# Patient Record
Sex: FEMALE | Race: OTHER | HISPANIC OR LATINO | Employment: FULL TIME | URBAN - METROPOLITAN AREA
[De-identification: names, ages, dates, MRNs, and addresses within clinical notes are randomized per-mention and may not be internally consistent; named-entity substitution may affect disease eponyms.]

---

## 2018-04-13 ENCOUNTER — HOSPITAL ENCOUNTER (OUTPATIENT)
Facility: MEDICAL CENTER | Age: 28
End: 2018-04-13
Attending: OBSTETRICS & GYNECOLOGY | Admitting: OBSTETRICS & GYNECOLOGY

## 2018-04-13 VITALS
RESPIRATION RATE: 16 BRPM | HEART RATE: 91 BPM | SYSTOLIC BLOOD PRESSURE: 111 MMHG | TEMPERATURE: 98.4 F | DIASTOLIC BLOOD PRESSURE: 67 MMHG

## 2018-04-13 LAB
APPEARANCE UR: CLEAR
COLOR UR AUTO: YELLOW
GLUCOSE UR QL STRIP.AUTO: NEGATIVE MG/DL
KETONES UR QL STRIP.AUTO: NEGATIVE MG/DL
LEUKOCYTE ESTERASE UR QL STRIP.AUTO: NEGATIVE
NITRITE UR QL STRIP.AUTO: NEGATIVE
PH UR STRIP.AUTO: 5.5 [PH]
PROT UR QL STRIP: NEGATIVE MG/DL
RBC UR QL AUTO: NEGATIVE
SP GR UR: <=1.005

## 2018-04-13 PROCEDURE — 81002 URINALYSIS NONAUTO W/O SCOPE: CPT

## 2018-04-13 NOTE — PROGRESS NOTES
[facility] LABOR AND DELIVERY TRIAGE PROGRESS NOTE    PATIENT ID:  NAME:  Faith Pascual  MRN:               [id number]  YOB: 1990     28 y.o. female  at 33w per pt    Subjective: Pt with lower abdominal pain and groin pain  Pregnancy complicated by prenatal care in [place].  Pt states possible abnormality on US in [place] but unable to recall name of abnormality.    negative  For CTXS.   positive Feels pain   negative for LOF  negative for vaginal bleeding.   positive for fetal movement    ROS: Patient denies any fever chills, nausea, vomiting, headache, chest pain, shortness of breath, or dysuria or unusual swelling of hands or feet.     Objective:    There were no vitals filed for this visit.  No data recorded.    General: No acute distress, resting comfortably in bed.  HEENT: normocephalic, nontraumatic, PERRLA, EOMI  Cardiovascular: Heart RRR with no murmurs, rubs or gallops. Distal Pulses 2+  Respiratory: symmetric chest expansion, lungs CTAB, with no wheezes, rales, rhonci  Abdomen: gravid, nontender  Musculoskeletal: strength 5/5 in four extremities  Neuro: non focal with no numbness, tingling or changes in sensation    Cervix: deferred  Peak: Uterine Contractions none      Assessment: 28 y.o. Female  at 33w with 2 prior CS deliveries in [place]  Last OB visit in Feb in [place]    Plan:   1. Discharge home with return precautions and instructions to return for abdominal pain, leaking of fluid or blood, headache, vision changes, lack of fetal movement  2.  Call today to establish care with [facility]       Discussed case with Dr. Liriano, TPN Attending. Case was discussed and attending agreed with plan prior to discharge of patient.

## 2018-04-13 NOTE — PROGRESS NOTES
1400-pt presents from home with c/o lower abd pain, pt pointed to where the round ligaments are, no PNC this pregnancy, pt states that she has had two previous c/s, states that at night she will feel some uc's and then feels the round ligament pain, no c/o leaking, bleeding or uc's at this time, states that she is feeling baby move normally, FHR dopplered at 150, TOCO applied, ua dipped negative  1415-Dr Brito in room for assessment, will discuss with Dr Liriano   1445-discharge order received  1450-pt discharged home with PTL precautions and instructions on how to get a hold of the pregnancy center for PNC, verbalized understanding, left ambulatory with family

## 2018-04-17 ENCOUNTER — APPOINTMENT (OUTPATIENT)
Dept: OBGYN | Facility: CLINIC | Age: 28
End: 2018-04-17
Payer: MEDICAID

## 2018-05-04 ENCOUNTER — INITIAL PRENATAL (OUTPATIENT)
Dept: OBGYN | Facility: CLINIC | Age: 28
End: 2018-05-04
Payer: MEDICAID

## 2018-05-04 ENCOUNTER — HOSPITAL ENCOUNTER (OUTPATIENT)
Facility: MEDICAL CENTER | Age: 28
End: 2018-05-04
Attending: NURSE PRACTITIONER
Payer: MEDICAID

## 2018-05-04 ENCOUNTER — HOSPITAL ENCOUNTER (OUTPATIENT)
Facility: MEDICAL CENTER | Age: 28
End: 2018-05-04
Attending: OBSTETRICS & GYNECOLOGY

## 2018-05-04 VITALS
WEIGHT: 156 LBS | HEIGHT: 61 IN | DIASTOLIC BLOOD PRESSURE: 62 MMHG | SYSTOLIC BLOOD PRESSURE: 114 MMHG | BODY MASS INDEX: 29.45 KG/M2

## 2018-05-04 DIAGNOSIS — O09.892 SUPERVISION OF OTHER HIGH RISK PREGNANCIES, SECOND TRIMESTER: ICD-10-CM

## 2018-05-04 DIAGNOSIS — Z98.891 HISTORY OF CESAREAN SECTION: ICD-10-CM

## 2018-05-04 DIAGNOSIS — Z34.90 ENCOUNTER FOR SUPERVISION OF NORMAL PREGNANCY, ANTEPARTUM, UNSPECIFIED GRAVIDITY: ICD-10-CM

## 2018-05-04 DIAGNOSIS — Z34.90 ENCOUNTER FOR SUPERVISION OF NORMAL PREGNANCY, ANTEPARTUM, UNSPECIFIED GRAVIDITY: Primary | ICD-10-CM

## 2018-05-04 LAB
APPEARANCE UR: CLEAR
BILIRUB UR STRIP-MCNC: NORMAL MG/DL
COLOR UR AUTO: YELLOW
GLUCOSE UR STRIP.AUTO-MCNC: NEGATIVE MG/DL
KETONES UR STRIP.AUTO-MCNC: NEGATIVE MG/DL
LEUKOCYTE ESTERASE UR QL STRIP.AUTO: NEGATIVE
NITRITE UR QL STRIP.AUTO: NEGATIVE
PH UR STRIP.AUTO: 7 [PH] (ref 5–8)
PROT UR QL STRIP: NEGATIVE MG/DL
RBC UR QL AUTO: NEGATIVE
SP GR UR STRIP.AUTO: 1.02
UROBILINOGEN UR STRIP-MCNC: NORMAL MG/DL

## 2018-05-04 PROCEDURE — 87491 CHLMYD TRACH DNA AMP PROBE: CPT

## 2018-05-04 PROCEDURE — 88175 CYTOPATH C/V AUTO FLUID REDO: CPT

## 2018-05-04 PROCEDURE — 87624 HPV HI-RISK TYP POOLED RSLT: CPT

## 2018-05-04 PROCEDURE — 87591 N.GONORRHOEAE DNA AMP PROB: CPT

## 2018-05-04 PROCEDURE — 59402 PR NEW OB HIGH RISK: CPT | Performed by: NURSE PRACTITIONER

## 2018-05-04 PROCEDURE — 8198 PREG CTR PKG RATE (SYSTEM): Performed by: OBSTETRICS & GYNECOLOGY

## 2018-05-04 PROCEDURE — 81002 URINALYSIS NONAUTO W/O SCOPE: CPT | Performed by: NURSE PRACTITIONER

## 2018-05-04 RX ORDER — ACETAMINOPHEN 500 MG
500-1000 TABLET ORAL EVERY 6 HOURS PRN
Status: ON HOLD | COMMUNITY
End: 2018-08-03

## 2018-05-04 NOTE — PATIENT INSTRUCTIONS
P:  1.  GC/CT done. Pap done.         2.  Prenatal labs, including 1hr GTT ordered - lab slip given        3.  Discussed PNV, diet, and adequate water intake        4.  NOB packet given        5.  Return to office in 4 wks        6.  Complete OB US asap.        7.  BTL consent at next visit.         8.  RCS consent signed.

## 2018-05-04 NOTE — PROGRESS NOTES
Pt here for New OB today  Phone: [phone]   Pharmacy verified  Pt is taking PNV  Desires BTL  1 hr GTT and blood work given today

## 2018-05-04 NOTE — LETTER
Estudios Para Detectar los Portadores de la Fibrosis Quística   (La Enfermedad Fibroquística del Páncreas)    Faith Pascual    Esta información esta relacionada con un examen de ann que puede determinar si usted o duncan carlos es portador del gen que causa la enfermedad hereditaria que se llama la fibrosis quística.     ¿QUE ES LA ENFERMEDAD FIBROSIS QUÍSTICA?  · La  fibrosis quística es ernesto enfermedad hereditaria que afecta a más de 25,000 niños y jóvenes adultos americanos.  · Los síntomas de la fibrosis quística varían de ernesto persona a otra.  Los síntomas más comunes son congestión pulmonar, diarrea y retraso en el crecimiento del salinas.  La mayoría de las personas con la fibrosis quística padecen de problemas médicos muy severos, y algunas mueren jóvenes.  Otras tienen tan pocos síntomas que no se percatan de que tienen fibrosis quística.  · La fibrosis quística no afecta en absoluto la inteligencia de la persona.  · Aunque actualmente no hay ernesto bonny para la fibrosis quística, los científicos están avanzando con respecto a nuevos tratamientos y en la búsqueda de la bonny.  Anteriormente la mayoría de las personas que padecían de fibrosis quística morían muy jóvenes.  Hoy en día, muchas personas que padecen de la fibrosis quística sobreviven hasta los 20 o 30 anos de edad.    ¿EXISTE LA POSIBILIDAD DE QUE MI ASHLIE PUEDA TENER FIBROSIS QUÍSTICA?  · Usted puede tener un hijo con la fibrosis quística aun cuando no hay nadie en duncan brady que la padezca.  (Lisa la grafica que sigue.)  · Existe ernesto prueba que puede ayudarle a determinar si aleksandra un gen para la fibrosis quística y si por eso corre un riesgo de tener un hijo con la enfermedad.  · Si ambos padres son portadores del gen para la fibrosis quística, hay ernesto (1) probabilidad entre 4 (25%) en cada embarazo, de que puedan tener un hijo afectada con fibrosis quística.  · Los portadores del gen para la fibrosis quística tienen ernesto copia del gen  normal y el otro gen alterado.  · Las personas con fibrosis quística tienen dos genes alterados para la fibrosis quística,  · Normalmente la mayoría de individuos tienen dos copias normales del gen para fibrosis quística.    Riesgo aproximado de que ernesto carlos sin familiares con fibrosis quística pueda tener un hijo con fibrosis quística:  Origen / Riesgo  Ernesto carlos Caucásica (de rohini alexa):  1 en 2,500  Ernesto carlos :  1 en 8,000  Ernesto carlos Afroamericana (de rohini romaine):  1 en 15,000  Ernesto carlos Asiática:  1 en 32,000    ¿EXISTEN PRUEBAS PARA DETECTAR LOS PORTADORES DE LA FIBROSIS QUÍSTICA?  · Hay ernesto prueba de ann para determinar si usted o duncan carlos portan el gen para la fibrosis quística.  · Es importante entender que la prueba no detecta todos los portadores del gen para la fibrosis quística.  · Si la prueba determina que ambos padres con portadores, se puede realizar otras pruebas para determinar si duncan futuro aron esta afectado.    ¿CUANTO BAYLEE LA PRUEBA PARA DETERMINAR SI SOY PORTADOR(A) DEL GEN PARA LA FIBROSIS QUÍSTICA?  · El costo de la prueba varia según duncan póliza de seguro medico y el laboratorio donde se efectúa el análisis.  · El costo promedio de la prueba es de $300.  · Duncan consejera genética puede darle mas información acera de esta prueba para determinar si usted es portador de la fibrosis quística.    _____  Si, yo estoy interesada y me gustaria obtener mas información al respecto.  _____  No tengo interés ni en hacerme la prueba para determinar si soy portador(a) de gen para la fibrosis quística ni en recibir mas información al respecto.    Firma del paciente: _____________________________   5/4/2018

## 2018-05-04 NOTE — PROGRESS NOTES
"Subjective:    Faith Pascual is a 28 y.o.   @ EGA: 26w0d DUNIA: Estimated Date of Delivery: 8/10/18  per US in [place] who presents for her new OB exam.  She has no complaints.  Too late for AFP.  Declines CF.  Reports good FM.  Denies VB, LOF, or cramping.  Denies dysuria, vaginal DC.  Pt is  and lives with . Not presently working.  Pregnancy is unplanned but wanted.  Desires BTL, hx of 2 CS in [place].         No chief complaint on file.          HPI  Review of Systems   All other systems reviewed and are negative.         Objective:     /62   Ht 1.549 m (5' 1\")   Wt 70.8 kg (156 lb)   BMI 29.48 kg/m²    FHTs: 150  Fundal ht: 28   Wet mount: deferred    Physical Exam   Constitutional: She is oriented to person, place, and time. She appears well-developed and well-nourished.   HENT:   Head: Normocephalic and atraumatic.   Eyes:   Eye and ear exam deferred   Neck: Normal range of motion. Neck supple. No thyromegaly present.   Cardiovascular: Normal rate, regular rhythm, normal heart sounds and intact distal pulses.    Pulmonary/Chest: Effort normal and breath sounds normal. Right breast exhibits no inverted nipple, no mass, no nipple discharge, no skin change and no tenderness. Left breast exhibits no inverted nipple, no mass, no nipple discharge, no skin change and no tenderness.   Abdominal: Soft. Bowel sounds are normal.   Gravid   Genitourinary: Vagina normal and uterus normal. Pelvic exam was performed with patient supine. Cervix exhibits no motion tenderness, no discharge and no friability. Right adnexum displays no mass, no tenderness and no fullness. Left adnexum displays no mass, no tenderness and no fullness.   Musculoskeletal: Normal range of motion.   Neurological: She is alert and oriented to person, place, and time.   Skin: Skin is warm and dry.   Psychiatric: She has a normal mood and affect. Her behavior is normal. Judgment and thought content normal. "   Nursing note and vitals reviewed.          A:   1.  IUP @ 26w0d DUNIA: Estimated Date of Delivery: 8/10/18 per US         2.  S=D        3.    Patient Active Problem List    Diagnosis Date Noted   • Supervision of other high risk pregnancies, second trimester 2018   • History of  section 2018       P:  1.  GC/CT done. Pap done.         2.  Prenatal labs, including 1hr GTT ordered - lab slip given        3.  Discussed PNV, diet, and adequate water intake        4.  NOB packet given        5.  Return to office in 4 wks        6.  Complete OB US asap.        7.  BTL consent at next visit.         8.  RCS consent signed.

## 2018-05-07 DIAGNOSIS — O09.892 SUPERVISION OF OTHER HIGH RISK PREGNANCIES, SECOND TRIMESTER: ICD-10-CM

## 2018-05-07 LAB — AMBIGUOUS DTTM AMBI4: NORMAL

## 2018-05-07 PROCEDURE — 88175 CYTOPATH C/V AUTO FLUID REDO: CPT

## 2018-05-10 LAB
C TRACH DNA GENITAL QL NAA+PROBE: NEGATIVE
CYTOLOGY REG CYTOL: NORMAL
HPV HR 12 DNA CVX QL NAA+PROBE: NEGATIVE
HPV16 DNA SPEC QL NAA+PROBE: NEGATIVE
HPV18 DNA SPEC QL NAA+PROBE: NEGATIVE
N GONORRHOEA DNA GENITAL QL NAA+PROBE: NEGATIVE
SPECIMEN SOURCE: NORMAL
SPECIMEN SOURCE: NORMAL

## 2018-05-16 ENCOUNTER — APPOINTMENT (OUTPATIENT)
Dept: RADIOLOGY | Facility: IMAGING CENTER | Age: 28
End: 2018-05-16
Attending: NURSE PRACTITIONER
Payer: MEDICAID

## 2018-05-16 ENCOUNTER — HOSPITAL ENCOUNTER (OUTPATIENT)
Dept: LAB | Facility: MEDICAL CENTER | Age: 28
End: 2018-05-16
Attending: NURSE PRACTITIONER
Payer: COMMERCIAL

## 2018-05-16 DIAGNOSIS — Z34.90 ENCOUNTER FOR SUPERVISION OF NORMAL PREGNANCY, ANTEPARTUM, UNSPECIFIED GRAVIDITY: ICD-10-CM

## 2018-05-16 LAB — GLUCOSE 1H P 50 G GLC PO SERPL-MCNC: 126 MG/DL (ref 70–139)

## 2018-05-16 PROCEDURE — 76805 OB US >/= 14 WKS SNGL FETUS: CPT | Mod: 26 | Performed by: OBSTETRICS & GYNECOLOGY

## 2018-05-16 PROCEDURE — 76805 OB US >/= 14 WKS SNGL FETUS: CPT | Mod: TC | Performed by: OBSTETRICS & GYNECOLOGY

## 2018-05-17 ENCOUNTER — HOSPITAL ENCOUNTER (EMERGENCY)
Facility: MEDICAL CENTER | Age: 28
End: 2018-05-17
Payer: MEDICAID

## 2018-05-17 ENCOUNTER — HOSPITAL ENCOUNTER (OUTPATIENT)
Facility: MEDICAL CENTER | Age: 28
End: 2018-05-17
Attending: OBSTETRICS & GYNECOLOGY | Admitting: OBSTETRICS & GYNECOLOGY

## 2018-05-17 VITALS
BODY MASS INDEX: 27.66 KG/M2 | HEART RATE: 93 BPM | DIASTOLIC BLOOD PRESSURE: 62 MMHG | TEMPERATURE: 97.9 F | SYSTOLIC BLOOD PRESSURE: 109 MMHG | HEIGHT: 63 IN | WEIGHT: 156.09 LBS

## 2018-05-17 LAB
APPEARANCE UR: CLEAR
COLOR UR AUTO: YELLOW
GLUCOSE UR QL STRIP.AUTO: NEGATIVE MG/DL
KETONES UR QL STRIP.AUTO: NEGATIVE MG/DL
LEUKOCYTE ESTERASE UR QL STRIP.AUTO: NEGATIVE
NITRITE UR QL STRIP.AUTO: NEGATIVE
PH UR STRIP.AUTO: 7 [PH]
PROT UR QL STRIP: NEGATIVE MG/DL
RBC UR QL AUTO: NEGATIVE
SP GR UR: 1.01

## 2018-05-17 PROCEDURE — 96372 THER/PROPH/DIAG INJ SC/IM: CPT

## 2018-05-17 PROCEDURE — A9270 NON-COVERED ITEM OR SERVICE: HCPCS | Performed by: STUDENT IN AN ORGANIZED HEALTH CARE EDUCATION/TRAINING PROGRAM

## 2018-05-17 PROCEDURE — 81002 URINALYSIS NONAUTO W/O SCOPE: CPT

## 2018-05-17 PROCEDURE — 700102 HCHG RX REV CODE 250 W/ 637 OVERRIDE(OP): Performed by: STUDENT IN AN ORGANIZED HEALTH CARE EDUCATION/TRAINING PROGRAM

## 2018-05-17 PROCEDURE — 700111 HCHG RX REV CODE 636 W/ 250 OVERRIDE (IP)

## 2018-05-17 RX ORDER — KETOROLAC TROMETHAMINE 30 MG/ML
30 INJECTION, SOLUTION INTRAMUSCULAR; INTRAVENOUS ONCE
Status: COMPLETED | OUTPATIENT
Start: 2018-05-17 | End: 2018-05-17

## 2018-05-17 RX ORDER — KETOROLAC TROMETHAMINE 30 MG/ML
INJECTION, SOLUTION INTRAMUSCULAR; INTRAVENOUS
Status: COMPLETED
Start: 2018-05-17 | End: 2018-05-17

## 2018-05-17 RX ORDER — KETOROLAC TROMETHAMINE 30 MG/ML
30 INJECTION, SOLUTION INTRAMUSCULAR; INTRAVENOUS ONCE
Status: DISCONTINUED | OUTPATIENT
Start: 2018-05-17 | End: 2018-05-17

## 2018-05-17 RX ORDER — ACETAMINOPHEN 325 MG/1
650 TABLET ORAL ONCE
Status: COMPLETED | OUTPATIENT
Start: 2018-05-17 | End: 2018-05-17

## 2018-05-17 RX ORDER — TERBUTALINE SULFATE 1 MG/ML
0.25 INJECTION, SOLUTION SUBCUTANEOUS ONCE
Status: DISCONTINUED | OUTPATIENT
Start: 2018-05-17 | End: 2018-05-17 | Stop reason: HOSPADM

## 2018-05-17 RX ADMIN — KETOROLAC TROMETHAMINE 30 MG: 30 INJECTION, SOLUTION INTRAMUSCULAR at 13:41

## 2018-05-17 RX ADMIN — ACETAMINOPHEN 650 MG: 325 TABLET, FILM COATED ORAL at 12:10

## 2018-05-17 RX ADMIN — KETOROLAC TROMETHAMINE 30 MG: 30 INJECTION, SOLUTION INTRAMUSCULAR; INTRAVENOUS at 13:41

## 2018-05-17 NOTE — PROGRESS NOTES
29yo, , edc8/10, 27.6 presents with c/o Headaches and constant abdominal tightness. Pt denies LOF, vag bleeding. POS fm. EFM and [place] placed. VSS.  Pt states her headache began last night. She has not taken anything for it. She states that she frequently gets headaches and that Tylenol works for her. The abdominal tightness started yesterday afternoon.   1205 Dr Hoskins updated. Order for Tylenol rcvd.   1211 Tylenol given. Pt does not feel UCs.   1230 Dr. Hoskins updated about pts UCs. Order rcvd.   1240 Language Line used to inform pt of order for Terbutaline. Pt states through  that her headache has not improved but that she does not feel her UCs. Pt wants to wait until she voids to see if she still needs injection.   1250 UA obtained and dipped, see results.   1340 Language Line used to inform pt of Dr. Hoskins ordering Toradol IM. Pt states that the abdominal tightness has improved. She said that her headache is primarily on the left and located behind her eye. This is only a slight improvement from this morning and since she took the tylenol. Pt accepted the Toradol injection and tolerated it well.   1430 Language line used for discharge instructions. Pt only has a slight headache now.  Discharge instructions given, pt states understanding. Pt discharged to home  in stable condition, ambulatory, with family.

## 2018-05-18 ENCOUNTER — TELEPHONE (OUTPATIENT)
Dept: OBGYN | Facility: CLINIC | Age: 28
End: 2018-05-18

## 2018-05-20 NOTE — ADDENDUM NOTE
Encounter addended by: Alejandro Lamas on: 5/20/2018 10:36 AM<BR>    Actions taken: Charge Capture section accepted

## 2018-06-01 ENCOUNTER — ROUTINE PRENATAL (OUTPATIENT)
Dept: OBGYN | Facility: CLINIC | Age: 28
End: 2018-06-01

## 2018-06-01 VITALS — SYSTOLIC BLOOD PRESSURE: 116 MMHG | DIASTOLIC BLOOD PRESSURE: 64 MMHG | BODY MASS INDEX: 28.87 KG/M2 | WEIGHT: 163 LBS

## 2018-06-01 DIAGNOSIS — Z98.891 HISTORY OF CESAREAN SECTION: ICD-10-CM

## 2018-06-01 DIAGNOSIS — O09.893 HISTORY OF PRETERM DELIVERY, CURRENTLY PREGNANT IN THIRD TRIMESTER: ICD-10-CM

## 2018-06-01 DIAGNOSIS — O09.892 SUPERVISION OF OTHER HIGH RISK PREGNANCIES, SECOND TRIMESTER: Primary | ICD-10-CM

## 2018-06-01 DIAGNOSIS — O28.3 ABNORMAL PREGNANCY US: ICD-10-CM

## 2018-06-01 PROCEDURE — 90471 IMMUNIZATION ADMIN: CPT | Performed by: PHYSICIAN ASSISTANT

## 2018-06-01 PROCEDURE — 90715 TDAP VACCINE 7 YRS/> IM: CPT | Performed by: PHYSICIAN ASSISTANT

## 2018-06-01 PROCEDURE — 90040 PR PRENATAL FOLLOW UP: CPT | Performed by: PHYSICIAN ASSISTANT

## 2018-06-01 NOTE — PROGRESS NOTES
OB f/u today  Pt reports decreased FM x 2 weeks   No VB, LOF or UC's.  Good phone # [phone]  Kick count sheet given today.  Desires TDap  Desires BTL  Pt has not received a call from Dr. Gaona's office yet  TDap given to patient on R Deltoid. Screening checklist reviewed with patient. VIS given - verified by AC

## 2018-06-01 NOTE — LETTER
Cuente los Movimientos de duncan Bebé  Otro paso importante para la umu de duncan bebé    Faith Mathew Pascual     [facility]            Dept: [phone]    ¿Cuántas semanas tiene de embarazo? 30w0d    Fecha cuando tiene que comenzar a contar el movimiento: 6-1-2018                  Colleen debe usar amanda diagrama    Ernesto manera en que duncan doctor puede controlar a umu de duncan bebé es sabiendo cuantas veces se mueve duncan bebé en el útero, o por medio de las “pataditas”.  Usted podrá ayudarle a duncan médico al usar cada día el siguiente diagrama.    Cada día, usted debe prestar atención a cuantas horas le lleva a duncan bebé moverse 10 veces.  Comience a contar en la mañana, lo antes posible después de haberse levantado.    · Primeramente, escriba la hora en que se mueve duncan bebé, hasta llegar a 10 veces.  · Colóquele un check o palomita a cada cuadrito cada vez que duncan bebé se mueva hasta que complete 10 veces.  · Escriba la hora cuando termine de contar 10 veces en la última columna.  · Sume el total del tiempo que le llevó contar los 10 movimientos.  · Finalmente, complete el cuadrito de cuantas horas le llevó hacerlo.    Después de antonette contado los 10 movimientos, ya no tendrá que contar los demás movimientos por el ramona del día.  A la mañana siguiente, comience a contar de nuevo cuantas veces se mueve el bebé desde el momento en que se levante.    ¿Qué tendría que considerarse un “movimiento”?  Es difícil de decirlo porque es distinto de ernesto madre a otra, y de un embarazo a otro.  Lo importante es que cuente el movimiento de la misma manera tariq el transcurso de duncan embarazo.  Si tiene preguntas adicionales, pregúntele a duncan doctor.    ¡Cuente cuidadosamente cada día!     MUESTRA:  Semana 28    ¿Cuántas horas le ha llevado sentir 10 movimientos?        Hora de Inicio     1     2     3     4     5     6     7     8     9     10   Hora de Finlizar   Jonnie. 8:20 ·  ·  ·  ·  ·  ·  ·  ·  ·  ·  11:40   Mar.               Mié.                Jue.               Vie.               Sáb.               Dom.                 IMPORTANTE:  Usted debe contactar a duncan doctor si le lleva más de 2 horas sentir 10 movimientos de duncan bebé.    Cada mañana, escriba la hora de inicio y comience a contar los movimientos de duncan bebé.  Hágalo colocándole un check o palomita a cada cuadrito cada vez que sienta un movimiento de duncan bebé.  Cuando haya sentido 10 “pataditas”, escriba la hora en que terminó de contar en la última columna.  Luego, complete en la cajita (arriba de la rose de check o palomita) el número total de horas que le llevó hacerlo.  Asegúrese de leer completamente las instrucciones en la página anterior.

## 2018-06-20 ENCOUNTER — ROUTINE PRENATAL (OUTPATIENT)
Dept: OBGYN | Facility: CLINIC | Age: 28
End: 2018-06-20

## 2018-06-20 VITALS — BODY MASS INDEX: 29.58 KG/M2 | DIASTOLIC BLOOD PRESSURE: 64 MMHG | SYSTOLIC BLOOD PRESSURE: 110 MMHG | WEIGHT: 167 LBS

## 2018-06-20 DIAGNOSIS — Z98.891 HISTORY OF CESAREAN SECTION: ICD-10-CM

## 2018-06-20 DIAGNOSIS — O09.892 SUPERVISION OF OTHER HIGH RISK PREGNANCIES, SECOND TRIMESTER: Primary | ICD-10-CM

## 2018-06-20 DIAGNOSIS — O28.3 ABNORMAL PREGNANCY US: ICD-10-CM

## 2018-06-20 DIAGNOSIS — O09.893 HISTORY OF PRETERM DELIVERY, CURRENTLY PREGNANT IN THIRD TRIMESTER: ICD-10-CM

## 2018-06-20 PROCEDURE — 90040 PR PRENATAL FOLLOW UP: CPT | Performed by: NURSE PRACTITIONER

## 2018-06-20 NOTE — PROGRESS NOTES
Pt here today for OB follow up  Pt states having abdominal pain   Reports +NATHANIEL Thomas # [phone]  Pharmacy Confirmed.  Pt has appt with JAIR on 7/2/18

## 2018-06-20 NOTE — LETTER
2018              Faith Pascual is currently being cared for at [facility].  She is pregnant and expecting to deliver via  section on or around 8/10/2018. Faith is requesting her mothers presence for the birth and assistance at home following surgery.        Thank you,          FLORENTINO Bo.    Electronically Signed

## 2018-06-20 NOTE — LETTER
2018            Faith Pascual is currently being cared for at [facility].  She is pregnant and expecting to deliver via  section on or around 8/10/2018. Faith is requesting her mothers presence for the birth and assistance at home following surgery.    Her mother's name is Pretty Hay. : 1971      Thank you,          FLORENTINO Bo.

## 2018-06-20 NOTE — PROGRESS NOTES
S:  Pt is  at 32w5d for routine OB follow up.  Reports increased pelvic pain, cannot stand at times. Pt is asking for letter to allow mother to come from [place] to help her after she has  Her C/S. Reports good FM.  Denies VB, LOF, RUCs or vaginal DC.    O:  Please see above vitals.        FHTs: 140        Fundal ht: 32 cm.        S=D        Declines SVE    A:  IUP at 32w5d  Patient Active Problem List    Diagnosis Date Noted   • History of  delivery x 2 - 35, 36wk 2018   • [facility]   • Supervision of other high risk pregnancies, second trimester 2018   • History of C/S x 2 - desires repeat, BTL (Consent signed 18) 2018        P:  1.  GBS @ 35 wks.          2.  Continue FKCs.          3.  Questions answered.          4.  Encouraged pt to tour L&D.          5.  Encourage adequate water intake.        6.  F/u 2 wks.        7.  Referral for R C/S and BTL sent

## 2018-07-06 ENCOUNTER — HOSPITAL ENCOUNTER (OUTPATIENT)
Facility: MEDICAL CENTER | Age: 28
End: 2018-07-06
Attending: NURSE PRACTITIONER
Payer: COMMERCIAL

## 2018-07-06 ENCOUNTER — ROUTINE PRENATAL (OUTPATIENT)
Dept: OBGYN | Facility: CLINIC | Age: 28
End: 2018-07-06

## 2018-07-06 ENCOUNTER — HOSPITAL ENCOUNTER (OUTPATIENT)
Dept: LAB | Facility: MEDICAL CENTER | Age: 28
End: 2018-07-06
Attending: NURSE PRACTITIONER
Payer: COMMERCIAL

## 2018-07-06 VITALS — WEIGHT: 170 LBS | DIASTOLIC BLOOD PRESSURE: 60 MMHG | BODY MASS INDEX: 30.11 KG/M2 | SYSTOLIC BLOOD PRESSURE: 122 MMHG

## 2018-07-06 DIAGNOSIS — Z98.891 HISTORY OF CESAREAN SECTION: ICD-10-CM

## 2018-07-06 DIAGNOSIS — O09.893 HISTORY OF PRETERM DELIVERY, CURRENTLY PREGNANT IN THIRD TRIMESTER: ICD-10-CM

## 2018-07-06 DIAGNOSIS — O09.893 SUPERVISION OF OTHER HIGH RISK PREGNANCIES, THIRD TRIMESTER: ICD-10-CM

## 2018-07-06 DIAGNOSIS — O28.3 ABNORMAL PREGNANCY US: ICD-10-CM

## 2018-07-06 LAB
ABO GROUP BLD: NORMAL
BACTERIA #/AREA URNS HPF: ABNORMAL /HPF
BLD GP AB SCN SERPL QL: NORMAL
EPI CELLS #/AREA URNS HPF: ABNORMAL /HPF
RH BLD: NORMAL
WBC #/AREA URNS HPF: ABNORMAL /HPF

## 2018-07-06 PROCEDURE — 90040 PR PRENATAL FOLLOW UP: CPT | Performed by: NURSE PRACTITIONER

## 2018-07-06 NOTE — PROGRESS NOTES
SUBJECTIVE:  Pt is a 28 y.o.   at 35w0d  gestation. Presents today for follow-up prenatal care. Reports no issues at this time.  Reports good  fetal movement. Denies cramping/contractions, bleeding or leaking of fluid. Denies dysuria, headaches, N/V, or other issues at this time. Generally feels well today. Reports tingling in hands and headache on and off relieved by tylenol. She reports like 6 contractions in a day, more at night. Not progressive or strong at this time. Patient reports that she brand labs from [place] but they were never scanned in and not sure if whole panel was done.     OBJECTIVE:  - See prenatal vitals flow  -   Vitals:    18 1427   BP: 122/60   Weight: 77.1 kg (170 lb)                 ASSESSMENT:   - IUP at 35w0d    - S=D   -   Patient Active Problem List    Diagnosis Date Noted   • History of  delivery x 2 - 35, 36wk 2018   • Abnormal pregnancy US - [facility] referral for CNS abnormalities seen on US 2018   • Supervision of other high risk pregnancies, third trimester 2018   • History of C/S x 2 - desires repeat, BTL (Consent signed 18) 2018         PLAN:  - GBS collected   - To do PNP ASAP  - redo of letter to immigration for patient's mother   - To LnD with any worsening pain or progressive contractions for possibly sooner C/S  - S/sx pregnancy and labor warning signs vs general discomforts discussed  - Fetal movements and kick counts reviewed   - Adequate hydration reinforced  - Nutrition/exercise/vitamin education; continued PNV  - C/s date pending   - S/p TDAP vacc  - Anticipatory guidance given  - RTC in 1 weeks for follow-up prenatal care

## 2018-07-06 NOTE — LETTER
July 6, 2018      Faith Pascual is currently pregnant and being cared for by [facility]. She will be having her csection when she is 39 weeks and will need assistance from 38 weeks onward starting 7/27/18. She needs assistance from her mother because she does not have other support people [place] in the [place], thank you for understanding. Pretty Hay is her mother's name and her birthday is [birth year]1971.             Thank you,          ZEINAB RiveroRYESENIA.    Electronically Signed

## 2018-07-06 NOTE — PROGRESS NOTES
Pt here today for OB follow up  GBS to be done today  Reports +FM  WT: 170 lb  BP: 122/60  Pt states she has been getting contractions that come and go, pt also reports having strong headaches everyday, numbness on both hands and itching. sensation all over her body x 1 week.   Pt states she had appt with Dr. Gaona on 07/02  William # [phone]

## 2018-07-07 LAB
APPEARANCE UR: ABNORMAL
BASOPHILS # BLD AUTO: 0.1 % (ref 0–1.8)
BASOPHILS # BLD: 0.01 K/UL (ref 0–0.12)
BILIRUB UR QL STRIP.AUTO: NEGATIVE
COLOR UR: YELLOW
EOSINOPHIL # BLD AUTO: 0.02 K/UL (ref 0–0.51)
EOSINOPHIL NFR BLD: 0.2 % (ref 0–6.9)
ERYTHROCYTE [DISTWIDTH] IN BLOOD BY AUTOMATED COUNT: 49.5 FL (ref 35.9–50)
GLUCOSE UR STRIP.AUTO-MCNC: NEGATIVE MG/DL
HBV SURFACE AG SER QL: NEGATIVE
HCT VFR BLD AUTO: 37.9 % (ref 37–47)
HGB BLD-MCNC: 12.4 G/DL (ref 12–16)
HIV 1+2 AB+HIV1 P24 AG SERPL QL IA: NON REACTIVE
IMM GRANULOCYTES # BLD AUTO: 0.05 K/UL (ref 0–0.11)
IMM GRANULOCYTES NFR BLD AUTO: 0.6 % (ref 0–0.9)
KETONES UR STRIP.AUTO-MCNC: NEGATIVE MG/DL
LEUKOCYTE ESTERASE UR QL STRIP.AUTO: ABNORMAL
LYMPHOCYTES # BLD AUTO: 1.7 K/UL (ref 1–4.8)
LYMPHOCYTES NFR BLD: 19.2 % (ref 22–41)
MCH RBC QN AUTO: 28.6 PG (ref 27–33)
MCHC RBC AUTO-ENTMCNC: 32.7 G/DL (ref 33.6–35)
MCV RBC AUTO: 87.3 FL (ref 81.4–97.8)
MICRO URNS: ABNORMAL
MONOCYTES # BLD AUTO: 0.54 K/UL (ref 0–0.85)
MONOCYTES NFR BLD AUTO: 6.1 % (ref 0–13.4)
NEUTROPHILS # BLD AUTO: 6.52 K/UL (ref 2–7.15)
NEUTROPHILS NFR BLD: 73.8 % (ref 44–72)
NITRITE UR QL STRIP.AUTO: NEGATIVE
NRBC # BLD AUTO: 0 K/UL
NRBC BLD-RTO: 0 /100 WBC
PH UR STRIP.AUTO: 7 [PH]
PLATELET # BLD AUTO: 158 K/UL (ref 164–446)
PMV BLD AUTO: 12.8 FL (ref 9–12.9)
PROT UR QL STRIP: NEGATIVE MG/DL
RBC # BLD AUTO: 4.34 M/UL (ref 4.2–5.4)
RBC UR QL AUTO: NEGATIVE
RUBV AB SER QL: 82.8 IU/ML
SP GR UR STRIP.AUTO: 1.01
TREPONEMA PALLIDUM IGG+IGM AB [PRESENCE] IN SERUM OR PLASMA BY IMMUNOASSAY: NON REACTIVE
UROBILINOGEN UR STRIP.AUTO-MCNC: 1 MG/DL
WBC # BLD AUTO: 8.8 K/UL (ref 4.8–10.8)

## 2018-07-08 LAB
BACTERIA UR CULT: NORMAL
GP B STREP DNA SPEC QL NAA+PROBE: NEGATIVE
SIGNIFICANT IND 70042: NORMAL
SITE SITE: NORMAL
SOURCE SOURCE: NORMAL

## 2018-07-12 ENCOUNTER — ROUTINE PRENATAL (OUTPATIENT)
Dept: OBGYN | Facility: CLINIC | Age: 28
End: 2018-07-12

## 2018-07-12 VITALS — DIASTOLIC BLOOD PRESSURE: 62 MMHG | SYSTOLIC BLOOD PRESSURE: 102 MMHG | WEIGHT: 171 LBS | BODY MASS INDEX: 30.29 KG/M2

## 2018-07-12 DIAGNOSIS — O09.893 HISTORY OF PRETERM DELIVERY, CURRENTLY PREGNANT IN THIRD TRIMESTER: ICD-10-CM

## 2018-07-12 DIAGNOSIS — O09.893 SUPERVISION OF OTHER HIGH RISK PREGNANCIES, THIRD TRIMESTER: ICD-10-CM

## 2018-07-12 DIAGNOSIS — O28.3 ABNORMAL PREGNANCY US: ICD-10-CM

## 2018-07-12 DIAGNOSIS — O99.119 THROMBOCYTOPENIA AFFECTING PREGNANCY (HCC): ICD-10-CM

## 2018-07-12 DIAGNOSIS — Z98.891 HISTORY OF CESAREAN SECTION: ICD-10-CM

## 2018-07-12 DIAGNOSIS — O36.8131 DECREASED FETAL MOVEMENTS IN THIRD TRIMESTER, FETUS 1 OF MULTIPLE GESTATION: ICD-10-CM

## 2018-07-12 DIAGNOSIS — D69.6 THROMBOCYTOPENIA AFFECTING PREGNANCY (HCC): ICD-10-CM

## 2018-07-12 LAB
APPEARANCE UR: NORMAL
BILIRUB UR STRIP-MCNC: NORMAL MG/DL
COLOR UR AUTO: NORMAL
GLUCOSE UR STRIP.AUTO-MCNC: NEGATIVE MG/DL
KETONES UR STRIP.AUTO-MCNC: NEGATIVE MG/DL
LEUKOCYTE ESTERASE UR QL STRIP.AUTO: NEGATIVE
NITRITE UR QL STRIP.AUTO: NEGATIVE
NST ACOUSTIC STIMULATION: NORMAL
NST ACTION NECESSARY: NORMAL
NST ASSESSMENT: NORMAL
NST BASELINE: 135
NST INDICATIONS: NORMAL
NST OTHER DATA: NORMAL
NST READ BY: NORMAL
NST RETURN: NORMAL
NST UTERINE ACTIVITY: NORMAL
PH UR STRIP.AUTO: 7.5 [PH] (ref 5–8)
PROT UR QL STRIP: NEGATIVE MG/DL
RBC UR QL AUTO: NEGATIVE
SP GR UR STRIP.AUTO: 1.02
UROBILINOGEN UR STRIP-MCNC: NORMAL MG/DL

## 2018-07-12 PROCEDURE — 90040 PR PRENATAL FOLLOW UP: CPT | Performed by: NURSE PRACTITIONER

## 2018-07-12 PROCEDURE — 59025 FETAL NON-STRESS TEST: CPT | Performed by: NURSE PRACTITIONER

## 2018-07-12 PROCEDURE — 81002 URINALYSIS NONAUTO W/O SCOPE: CPT | Performed by: NURSE PRACTITIONER

## 2018-07-12 NOTE — PROGRESS NOTES
Pt here today for OB follow up  Pt states pressure and some pain in lower abdomen and lower back  Reports +FM, has not felt baby move much  Good # [phone]  Pharmacy Confirmed.

## 2018-07-12 NOTE — PROGRESS NOTES
SUBJECTIVE:  Pt is a 28 y.o.   at 35w6d  gestation. Presents today for follow-up prenatal care. Reports no issues at this time.  Reports decreased fetal movement. Denies cramping/contractions, bleeding or leaking of fluid. Denies dysuria, headaches, N/V, or other issues at this time. Generally feels well today. Reports one kick since 8 am and has not sat to do kick counts.     OBJECTIVE:  - See prenatal vitals flow  -   Vitals:    18 1509   BP: 102/62   Weight: 77.6 kg (171 lb)                 ASSESSMENT:   - IUP at 35w6d    - S=D   -   Patient Active Problem List    Diagnosis Date Noted   • Borderline thrombocytopenia affecting pregnancy  2018   • History of  delivery x 2 - 35, 36wk 2018   • Abnormal pregnancy US - [facility] referral for CNS abnormalities seen on US 2018   • Supervision of other high risk pregnancies, third trimester 2018   • History of C/S x 2 - desires repeat, BTL (Consent signed 18) 2018         PLAN:  - S/sx pregnancy and labor warning signs vs general discomforts discussed  - Fetal movements and kick counts reviewed   - Adequate hydration reinforced  - Nutrition/exercise/vitamin education; continued PNV  - S/p TDAP vacc  - Anticipatory guidance given  - For NST now and kick counts tonite if reactive now; to LnD with any continued decreased FM   - RTC in 1 weeks for follow-up prenatal care

## 2018-07-20 ENCOUNTER — ROUTINE PRENATAL (OUTPATIENT)
Dept: OBGYN | Facility: CLINIC | Age: 28
End: 2018-07-20

## 2018-07-20 VITALS — DIASTOLIC BLOOD PRESSURE: 56 MMHG | WEIGHT: 171 LBS | SYSTOLIC BLOOD PRESSURE: 116 MMHG | BODY MASS INDEX: 30.29 KG/M2

## 2018-07-20 DIAGNOSIS — O99.119 THROMBOCYTOPENIA AFFECTING PREGNANCY (HCC): ICD-10-CM

## 2018-07-20 DIAGNOSIS — O28.3 ABNORMAL PREGNANCY US: ICD-10-CM

## 2018-07-20 DIAGNOSIS — O09.893 SUPERVISION OF OTHER HIGH RISK PREGNANCIES, THIRD TRIMESTER: Primary | ICD-10-CM

## 2018-07-20 DIAGNOSIS — D69.6 THROMBOCYTOPENIA AFFECTING PREGNANCY (HCC): ICD-10-CM

## 2018-07-20 DIAGNOSIS — Z98.891 HISTORY OF CESAREAN SECTION: ICD-10-CM

## 2018-07-20 DIAGNOSIS — O09.893 HISTORY OF PRETERM DELIVERY, CURRENTLY PREGNANT IN THIRD TRIMESTER: ICD-10-CM

## 2018-07-20 PROCEDURE — 90040 PR PRENATAL FOLLOW UP: CPT | Performed by: PHYSICIAN ASSISTANT

## 2018-07-20 NOTE — PROGRESS NOTES
Pt here today for OB follow up  Negativr GBS, Pt aware  Reports +FM  WT: 171 lb  BP: 116/56  Hdd appt with Dr. Gaona on 07/02/18  Pt states she has been having contractions that come and go, pt also reports having  chest pain and difficulty breathing for the past 3 days and dizziness.   Good # [phone]

## 2018-07-20 NOTE — PROGRESS NOTES
Pt has no complaints with regular or strong UCs, VB, LOF though pt feels occ SOB, joão at night. Likely fetal position, as this is the furthest she has gotten in pregnancy. Pt denies severe chest pain. Pt has occ dizziness, but is walking a lot outside and not drinking a lot of water, urged to incr water intake. +FM but pt is feeling more at night only - pt urged to do FKC daily. US done with Dr Gaona 7/2 for mild ventriculomegaly wnl, no f/u indicated. C/S to be done at 39wk, and referral sent 6/20, but no date scheduled yet. MA to call for date asap - no record yet, so surgery scheduler contacted and another referral placed urgently - pt to call by Monday if no date. Labor precautions reviewed. Daily FKC and walks recommended. RTC 1 wk or sooner prn.

## 2018-07-27 ENCOUNTER — ROUTINE PRENATAL (OUTPATIENT)
Dept: OBGYN | Facility: CLINIC | Age: 28
End: 2018-07-27

## 2018-07-27 VITALS — SYSTOLIC BLOOD PRESSURE: 112 MMHG | BODY MASS INDEX: 30.47 KG/M2 | WEIGHT: 172 LBS | DIASTOLIC BLOOD PRESSURE: 60 MMHG

## 2018-07-27 DIAGNOSIS — O99.119 THROMBOCYTOPENIA AFFECTING PREGNANCY (HCC): ICD-10-CM

## 2018-07-27 DIAGNOSIS — D69.6 THROMBOCYTOPENIA AFFECTING PREGNANCY (HCC): ICD-10-CM

## 2018-07-27 DIAGNOSIS — O09.893 SUPERVISION OF OTHER HIGH RISK PREGNANCIES, THIRD TRIMESTER: Primary | ICD-10-CM

## 2018-07-27 DIAGNOSIS — Z98.891 HISTORY OF CESAREAN SECTION: ICD-10-CM

## 2018-07-27 DIAGNOSIS — O28.3 ABNORMAL PREGNANCY US: ICD-10-CM

## 2018-07-27 DIAGNOSIS — O36.8130 DECREASED FETAL MOVEMENTS IN THIRD TRIMESTER, SINGLE OR UNSPECIFIED FETUS: ICD-10-CM

## 2018-07-27 DIAGNOSIS — O09.893 HISTORY OF PRETERM DELIVERY, CURRENTLY PREGNANT IN THIRD TRIMESTER: ICD-10-CM

## 2018-07-27 LAB
NST ACOUSTIC STIMULATION: NORMAL
NST ACTION NECESSARY: NORMAL
NST ASSESSMENT: NORMAL
NST BASELINE: 135
NST INDICATIONS: NORMAL
NST OTHER DATA: NORMAL
NST READ BY: NORMAL
NST RETURN: NORMAL
NST UTERINE ACTIVITY: NORMAL

## 2018-07-27 PROCEDURE — 90040 PR PRENATAL FOLLOW UP: CPT | Performed by: NURSE PRACTITIONER

## 2018-07-27 PROCEDURE — 59025 FETAL NON-STRESS TEST: CPT | Performed by: NURSE PRACTITIONER

## 2018-07-27 NOTE — PROGRESS NOTES
OB f/u today  No VB, LOF or UC's.  Good phone # [phone]  Pre op Tues July 31 at 1:15pm with Dr Obrien.  C section Fri Aug 3 at 9:30am with Dr Obrien - pt given information and instructions   GBS negative   Pt reports FM has decreased but still passing ELIAS every day

## 2018-07-27 NOTE — PROGRESS NOTES
S:  Pt is  at 38w0d here for routine OB follow up.  No c/o today.  Reports decr FM.  Denies VB, LOF, RUCs, or vaginal DC.     O:  Please see above vitals.        FHTs: 140        Fundal ht: 38        Fetal position: vertex        SVE: deferred        GBS neg on 18 -- reviewed w pt.         NST obtained: baseline 135 +accels -decels mod variability. Several FM appreciated.       [place]: One UC    A:  IUP at 38w0d  Patient Active Problem List    Diagnosis Date Noted   • Borderline thrombocytopenia affecting pregnancy  2018   • History of  delivery x 2 - 35, 36wk 2018   • Abnormal pregnancy US - [facility] referral for CNS abnormalities seen on US 2018   • Supervision of other high risk pregnancies, third trimester 2018   • History of C/S x 2 - desires repeat, BTL (Consent signed 18) 2018       P:  1.  Continue FKCs.         2.  Labor precautions given.  Instructions given on where to go.  Pt receptive to education.         3.  D/w pt RCS policy.  Pt has date/time.         4.  Questions answered.         5.  Encouraged adequate water intake       6.  F/u 1wk w/MDs for pre-op appt       7.  PP contraception: plans BTL.

## 2018-07-27 NOTE — PATIENT INSTRUCTIONS
P:  1.  Continue FKCs.         2.  Labor precautions given.  Instructions given on where to go.  Pt receptive to education.         3.  D/w pt RCS policy.  Pt has date/time.         4.  Questions answered.         5.  Encouraged adequate water intake       6.  F/u 1wk w/MDs for pre-op appt       7.  PP contraception: plans BTL.

## 2018-07-31 ENCOUNTER — ROUTINE PRENATAL (OUTPATIENT)
Dept: OBGYN | Facility: CLINIC | Age: 28
End: 2018-07-31

## 2018-07-31 VITALS — SYSTOLIC BLOOD PRESSURE: 110 MMHG | BODY MASS INDEX: 30.82 KG/M2 | DIASTOLIC BLOOD PRESSURE: 64 MMHG | WEIGHT: 174 LBS

## 2018-07-31 DIAGNOSIS — O28.3 ABNORMAL PREGNANCY US: ICD-10-CM

## 2018-07-31 DIAGNOSIS — Z98.891 HISTORY OF CESAREAN SECTION: ICD-10-CM

## 2018-07-31 DIAGNOSIS — D69.6 THROMBOCYTOPENIA AFFECTING PREGNANCY (HCC): ICD-10-CM

## 2018-07-31 DIAGNOSIS — O09.893 HISTORY OF PRETERM DELIVERY, CURRENTLY PREGNANT IN THIRD TRIMESTER: ICD-10-CM

## 2018-07-31 DIAGNOSIS — O09.893 SUPERVISION OF OTHER HIGH RISK PREGNANCIES, THIRD TRIMESTER: ICD-10-CM

## 2018-07-31 DIAGNOSIS — O99.119 THROMBOCYTOPENIA AFFECTING PREGNANCY (HCC): ICD-10-CM

## 2018-07-31 PROCEDURE — 90040 PR PRENATAL FOLLOW UP: CPT | Performed by: OBSTETRICS & GYNECOLOGY

## 2018-07-31 NOTE — PROGRESS NOTES
Pt here today for OB follow up / Pre OP  Reports +FM  WT: 174 lb  BP: 110/64  Pt states she has been having contractions that come and go. States no other complaints.   C/Section scheduled on Friday 08/03/18 @ 9:30 am. Instructions has been given to patient.   Chaperone offered, pt desires   Good # [phone]

## 2018-07-31 NOTE — PROGRESS NOTES
OB PreOp    CC: c/s preop    HPI:  Ms. Faith Pascual is a 28 y.o.  @ 38w4d here for preop visit for repeat c/s w/ BTL.  Doing well today.    Contractions: rare, not painful  Loss of fluid: No   Vaginal bleeding: No   Fetal movement: +    PNC w/ TPC since 26wks  Pt had referral to Dr. Gaona for possible CNS abnormality (lateral ventricles at upper limit of normal and cerebral ventricle mildly prominent in appearance), US w/ Dr. Gaona showed normal anatomy.    PNL: Rh+/-, RI, RPR NR, HBsAg neg, HIV neg, GBS neg        ROS:  Const: denies fevers, general concerns  CV/resp: reports no concerns  GI: denies abd pain, GI concerns  : see HPI  Heme: no hx of blood transfusion    OB History    Para Term  AB Living   3 2   2   2   SAB TAB Ectopic Molar Multiple Live Births             2      # Outcome Date GA Lbr Hayden/2nd Weight Sex Delivery Anes PTL Lv   3 Current            2  / 35w0d  2.2 kg (4 lb 13.6 oz) F CS-Unspec  N ILENE   1  / 36w0d  3.9 kg (8 lb 9.6 oz) M CS-Unspec  N ILENE      reports c/s for fetal distress.    Denies complications with her surgeries.    GYN: pap NILM, no STIs    Past Medical History:   Diagnosis Date   • Fibroids 2018       PSHx: prior c/s x2  Meds: PNV      FamHx: denies cancers, medical problems    Social History     Social History   • Marital status:      Spouse name: N/A   • Number of children: N/A   • Years of education: N/A     Occupational History   • Not on file.     Social History Main Topics   • Smoking status: Never Smoker   • Smokeless tobacco: Never Used   • Alcohol use No   • Drug use: No   • Sexual activity: Not Currently     Other Topics Concern   • Not on file     Social History Narrative   • No narrative on file       PE:  /64   Wt 78.9 kg (174 lb)   BMI 30.82 kg/m²   gen: AAO, NAD  CV: RRR  Resp: ctab  abd: soft, gravid, NT, fh 39, FHTs 140 , cephalic  Ext: NT,  edema      A/P: 28 y.o.  @ 38w4d by pt  reported DUNIA c/w 28wk US with hx of c/s x2  - plan for repeat c/s w/ BTL, BTL consent signed 6/1 and under media tab; pt confirms today she does not desire more chidlren  - discussed risks of procedure including pain/bleeding/infection/damage to intraabdominal structures (bladder/intestines). Pt confirms she is OK with transfusion in case of emergency      Reviewed labor precautions  To L&D Fri AM      Tamia Carbone MD  [facility], Women's Health

## 2018-08-03 ENCOUNTER — HOSPITAL ENCOUNTER (INPATIENT)
Facility: MEDICAL CENTER | Age: 28
LOS: 3 days | End: 2018-08-06
Attending: OBSTETRICS & GYNECOLOGY | Admitting: OBSTETRICS & GYNECOLOGY
Payer: MEDICAID

## 2018-08-03 DIAGNOSIS — Z98.891 HISTORY OF CESAREAN SECTION: ICD-10-CM

## 2018-08-03 LAB
BASOPHILS # BLD AUTO: 0.2 % (ref 0–1.8)
BASOPHILS # BLD: 0.02 K/UL (ref 0–0.12)
EOSINOPHIL # BLD AUTO: 0.04 K/UL (ref 0–0.51)
EOSINOPHIL NFR BLD: 0.5 % (ref 0–6.9)
ERYTHROCYTE [DISTWIDTH] IN BLOOD BY AUTOMATED COUNT: 49.1 FL (ref 35.9–50)
HCT VFR BLD AUTO: 39.8 % (ref 37–47)
HGB BLD-MCNC: 12.9 G/DL (ref 12–16)
HOLDING TUBE BB 8507: NORMAL
IMM GRANULOCYTES # BLD AUTO: 0.04 K/UL (ref 0–0.11)
IMM GRANULOCYTES NFR BLD AUTO: 0.5 % (ref 0–0.9)
LYMPHOCYTES # BLD AUTO: 2.07 K/UL (ref 1–4.8)
LYMPHOCYTES NFR BLD: 25.7 % (ref 22–41)
MCH RBC QN AUTO: 28.3 PG (ref 27–33)
MCHC RBC AUTO-ENTMCNC: 32.4 G/DL (ref 33.6–35)
MCV RBC AUTO: 87.3 FL (ref 81.4–97.8)
MONOCYTES # BLD AUTO: 0.61 K/UL (ref 0–0.85)
MONOCYTES NFR BLD AUTO: 7.6 % (ref 0–13.4)
NEUTROPHILS # BLD AUTO: 5.28 K/UL (ref 2–7.15)
NEUTROPHILS NFR BLD: 65.5 % (ref 44–72)
NRBC # BLD AUTO: 0 K/UL
NRBC BLD-RTO: 0 /100 WBC
PLATELET # BLD AUTO: 141 K/UL (ref 164–446)
PMV BLD AUTO: 12.9 FL (ref 9–12.9)
RBC # BLD AUTO: 4.56 M/UL (ref 4.2–5.4)
WBC # BLD AUTO: 8.1 K/UL (ref 4.8–10.8)

## 2018-08-03 PROCEDURE — 700111 HCHG RX REV CODE 636 W/ 250 OVERRIDE (IP)

## 2018-08-03 PROCEDURE — 700112 HCHG RX REV CODE 229: Performed by: OBSTETRICS & GYNECOLOGY

## 2018-08-03 PROCEDURE — 0UL77ZZ OCCLUSION OF BILATERAL FALLOPIAN TUBES, VIA NATURAL OR ARTIFICIAL OPENING: ICD-10-PCS | Performed by: OBSTETRICS & GYNECOLOGY

## 2018-08-03 PROCEDURE — 700105 HCHG RX REV CODE 258: Performed by: ANESTHESIOLOGY

## 2018-08-03 PROCEDURE — 305385 HCHG SURGICAL SERVICES 1/4 HOUR: Performed by: OBSTETRICS & GYNECOLOGY

## 2018-08-03 PROCEDURE — 700101 HCHG RX REV CODE 250

## 2018-08-03 PROCEDURE — A9270 NON-COVERED ITEM OR SERVICE: HCPCS

## 2018-08-03 PROCEDURE — 503053 HCHG HEMOSTAT POWDER-3GRAM

## 2018-08-03 PROCEDURE — 700102 HCHG RX REV CODE 250 W/ 637 OVERRIDE(OP): Performed by: ANESTHESIOLOGY

## 2018-08-03 PROCEDURE — A9270 NON-COVERED ITEM OR SERVICE: HCPCS | Performed by: OBSTETRICS & GYNECOLOGY

## 2018-08-03 PROCEDURE — A9270 NON-COVERED ITEM OR SERVICE: HCPCS | Performed by: ANESTHESIOLOGY

## 2018-08-03 PROCEDURE — 304966 HCHG RECOVERY SVSC TIME ADDL 1/2 HR: Performed by: OBSTETRICS & GYNECOLOGY

## 2018-08-03 PROCEDURE — 700102 HCHG RX REV CODE 250 W/ 637 OVERRIDE(OP)

## 2018-08-03 PROCEDURE — 85025 COMPLETE CBC W/AUTO DIFF WBC: CPT

## 2018-08-03 PROCEDURE — 4A1HXCZ MONITORING OF PRODUCTS OF CONCEPTION, CARDIAC RATE, EXTERNAL APPROACH: ICD-10-PCS | Performed by: OBSTETRICS & GYNECOLOGY

## 2018-08-03 PROCEDURE — 700111 HCHG RX REV CODE 636 W/ 250 OVERRIDE (IP): Performed by: ANESTHESIOLOGY

## 2018-08-03 PROCEDURE — 304964 HCHG RECOVERY ROOM TIME 1HR: Performed by: OBSTETRICS & GYNECOLOGY

## 2018-08-03 PROCEDURE — 88302 TISSUE EXAM BY PATHOLOGIST: CPT

## 2018-08-03 PROCEDURE — 306828 HCHG ANES-TIME GENERAL: Performed by: OBSTETRICS & GYNECOLOGY

## 2018-08-03 PROCEDURE — 36415 COLL VENOUS BLD VENIPUNCTURE: CPT

## 2018-08-03 PROCEDURE — 770002 HCHG ROOM/CARE - OB PRIVATE (112)

## 2018-08-03 PROCEDURE — 59514 CESAREAN DELIVERY ONLY: CPT

## 2018-08-03 RX ORDER — VITAMIN A ACETATE, BETA CAROTENE, ASCORBIC ACID, CHOLECALCIFEROL, .ALPHA.-TOCOPHEROL ACETATE, DL-, THIAMINE MONONITRATE, RIBOFLAVIN, NIACINAMIDE, PYRIDOXINE HYDROCHLORIDE, FOLIC ACID, CYANOCOBALAMIN, CALCIUM CARBONATE, FERROUS FUMARATE, ZINC OXIDE, CUPRIC OXIDE 3080; 12; 120; 400; 1; 1.84; 3; 20; 22; 920; 25; 200; 27; 10; 2 [IU]/1; UG/1; MG/1; [IU]/1; MG/1; MG/1; MG/1; MG/1; MG/1; [IU]/1; MG/1; MG/1; MG/1; MG/1; MG/1
1 TABLET, FILM COATED ORAL EVERY MORNING
Status: DISCONTINUED | OUTPATIENT
Start: 2018-08-04 | End: 2018-08-06 | Stop reason: HOSPADM

## 2018-08-03 RX ORDER — SODIUM CHLORIDE, SODIUM LACTATE, POTASSIUM CHLORIDE, CALCIUM CHLORIDE 600; 310; 30; 20 MG/100ML; MG/100ML; MG/100ML; MG/100ML
INJECTION, SOLUTION INTRAVENOUS CONTINUOUS
Status: DISCONTINUED | OUTPATIENT
Start: 2018-08-03 | End: 2018-08-03 | Stop reason: HOSPADM

## 2018-08-03 RX ORDER — SODIUM CHLORIDE, SODIUM LACTATE, POTASSIUM CHLORIDE, CALCIUM CHLORIDE 600; 310; 30; 20 MG/100ML; MG/100ML; MG/100ML; MG/100ML
INJECTION, SOLUTION INTRAVENOUS PRN
Status: DISCONTINUED | OUTPATIENT
Start: 2018-08-03 | End: 2018-08-06 | Stop reason: HOSPADM

## 2018-08-03 RX ORDER — NALOXONE HYDROCHLORIDE 0.4 MG/ML
0.1 INJECTION, SOLUTION INTRAMUSCULAR; INTRAVENOUS; SUBCUTANEOUS PRN
Status: DISCONTINUED | OUTPATIENT
Start: 2018-08-03 | End: 2018-08-04

## 2018-08-03 RX ORDER — SIMETHICONE 80 MG
80 TABLET,CHEWABLE ORAL 4 TIMES DAILY PRN
Status: DISCONTINUED | OUTPATIENT
Start: 2018-08-03 | End: 2018-08-06 | Stop reason: HOSPADM

## 2018-08-03 RX ORDER — OXYCODONE HYDROCHLORIDE AND ACETAMINOPHEN 5; 325 MG/1; MG/1
2 TABLET ORAL EVERY 4 HOURS PRN
Status: ACTIVE | OUTPATIENT
Start: 2018-08-03 | End: 2018-08-04

## 2018-08-03 RX ORDER — DOCUSATE SODIUM 100 MG/1
100 CAPSULE, LIQUID FILLED ORAL 2 TIMES DAILY
Status: DISCONTINUED | OUTPATIENT
Start: 2018-08-03 | End: 2018-08-06 | Stop reason: HOSPADM

## 2018-08-03 RX ORDER — SODIUM CHLORIDE, SODIUM GLUCONATE, SODIUM ACETATE, POTASSIUM CHLORIDE AND MAGNESIUM CHLORIDE 526; 502; 368; 37; 30 MG/100ML; MG/100ML; MG/100ML; MG/100ML; MG/100ML
1500 INJECTION, SOLUTION INTRAVENOUS ONCE
Status: COMPLETED | OUTPATIENT
Start: 2018-08-03 | End: 2018-08-03

## 2018-08-03 RX ORDER — CITRIC ACID/SODIUM CITRATE 334-500MG
30 SOLUTION, ORAL ORAL ONCE
Status: COMPLETED | OUTPATIENT
Start: 2018-08-03 | End: 2018-08-03

## 2018-08-03 RX ORDER — DIPHENHYDRAMINE HYDROCHLORIDE 50 MG/ML
12.5 INJECTION INTRAMUSCULAR; INTRAVENOUS EVERY 6 HOURS PRN
Status: DISCONTINUED | OUTPATIENT
Start: 2018-08-03 | End: 2018-08-04

## 2018-08-03 RX ORDER — OXYCODONE HYDROCHLORIDE AND ACETAMINOPHEN 5; 325 MG/1; MG/1
1 TABLET ORAL EVERY 4 HOURS PRN
Status: DISPENSED | OUTPATIENT
Start: 2018-08-03 | End: 2018-08-04

## 2018-08-03 RX ORDER — METOCLOPRAMIDE HYDROCHLORIDE 5 MG/ML
10 INJECTION INTRAMUSCULAR; INTRAVENOUS ONCE
Status: COMPLETED | OUTPATIENT
Start: 2018-08-03 | End: 2018-08-03

## 2018-08-03 RX ORDER — KETOROLAC TROMETHAMINE 30 MG/ML
30 INJECTION, SOLUTION INTRAMUSCULAR; INTRAVENOUS EVERY 6 HOURS
Status: COMPLETED | OUTPATIENT
Start: 2018-08-03 | End: 2018-08-04

## 2018-08-03 RX ORDER — OXYCODONE HYDROCHLORIDE AND ACETAMINOPHEN 5; 325 MG/1; MG/1
1 TABLET ORAL ONCE
Status: COMPLETED | OUTPATIENT
Start: 2018-08-03 | End: 2018-08-03

## 2018-08-03 RX ORDER — ONDANSETRON 2 MG/ML
4 INJECTION INTRAMUSCULAR; INTRAVENOUS EVERY 6 HOURS PRN
Status: DISCONTINUED | OUTPATIENT
Start: 2018-08-03 | End: 2018-08-04

## 2018-08-03 RX ORDER — CEFAZOLIN SODIUM 1 G/3ML
2 INJECTION, POWDER, FOR SOLUTION INTRAMUSCULAR; INTRAVENOUS ONCE
Status: DISCONTINUED | OUTPATIENT
Start: 2018-08-03 | End: 2018-08-03 | Stop reason: HOSPADM

## 2018-08-03 RX ORDER — OXYCODONE HYDROCHLORIDE AND ACETAMINOPHEN 5; 325 MG/1; MG/1
TABLET ORAL
Status: COMPLETED
Start: 2018-08-03 | End: 2018-08-03

## 2018-08-03 RX ADMIN — METOCLOPRAMIDE 10 MG: 5 INJECTION, SOLUTION INTRAMUSCULAR; INTRAVENOUS at 09:34

## 2018-08-03 RX ADMIN — SODIUM CHLORIDE, SODIUM GLUCONATE, SODIUM ACETATE, POTASSIUM CHLORIDE AND MAGNESIUM CHLORIDE 1000 ML: 526; 502; 368; 37; 30 INJECTION, SOLUTION INTRAVENOUS at 08:10

## 2018-08-03 RX ADMIN — OXYCODONE AND ACETAMINOPHEN 1 TABLET: 5; 325 TABLET ORAL at 13:35

## 2018-08-03 RX ADMIN — Medication 20 UNITS: at 12:15

## 2018-08-03 RX ADMIN — FAMOTIDINE 20 MG: 10 INJECTION INTRAVENOUS at 09:34

## 2018-08-03 RX ADMIN — OXYCODONE HYDROCHLORIDE AND ACETAMINOPHEN 1 TABLET: 5; 325 TABLET ORAL at 16:27

## 2018-08-03 RX ADMIN — DOCUSATE SODIUM 100 MG: 100 CAPSULE, LIQUID FILLED ORAL at 18:10

## 2018-08-03 RX ADMIN — OXYCODONE HYDROCHLORIDE AND ACETAMINOPHEN 1 TABLET: 5; 325 TABLET ORAL at 13:35

## 2018-08-03 RX ADMIN — SODIUM CITRATE AND CITRIC ACID MONOHYDRATE 30 ML: 500; 334 SOLUTION ORAL at 09:34

## 2018-08-03 RX ADMIN — KETOROLAC TROMETHAMINE 30 MG: 30 INJECTION, SOLUTION INTRAMUSCULAR at 18:10

## 2018-08-03 ASSESSMENT — PAIN SCALES - GENERAL
PAINLEVEL_OUTOF10: 4
PAINLEVEL_OUTOF10: 0
PAINLEVEL_OUTOF10: 6
PAINLEVEL_OUTOF10: 0

## 2018-08-03 ASSESSMENT — PATIENT HEALTH QUESTIONNAIRE - PHQ9
1. LITTLE INTEREST OR PLEASURE IN DOING THINGS: NOT AT ALL
SUM OF ALL RESPONSES TO PHQ9 QUESTIONS 1 AND 2: 0
2. FEELING DOWN, DEPRESSED, IRRITABLE, OR HOPELESS: NOT AT ALL

## 2018-08-03 ASSESSMENT — LIFESTYLE VARIABLES
ALCOHOL_USE: NO
EVER_SMOKED: NEVER

## 2018-08-03 NOTE — OR SURGEON
Immediate Post OP Note    PreOp Diagnosis:   1. SIUP @ 39w0d  2. History of c/s x2  3. Desires permanent sterilization    PostOp Diagnosis:   S/p repeat  delivery w/ bilateral tubal ligation    Procedure(s):  REPEAT C SECTION - Wound Class: Clean Contaminated    Surgeon(s):  Tamia Carbone M.D.    Anesthesiologist/Type of Anesthesia:  Anesthesiologist: King Olivares M.D./Spinal    Surgical Staff:  Circulator: Catherine Haas R.N.  Scrub Person: Xenia Tejeda  L&SHIRLEY Baby  Nurse: Isabella Antunez R.N.    Specimens removed if any:  Right and left tubal segments sent to pathology    Estimated Blood Loss: 800cc    Findings:   Vigorous female infant, vertex, OA position, AGPARs , wt 3887g    Complications: none        8/3/2018 12:36 PM Tamia Carbone M.D.

## 2018-08-03 NOTE — PROGRESS NOTES
1247 Nate from  Lab/Pathology phoned, updated on tubal segments sent in [facility]. Nate verbalized receiving them back to RN.

## 2018-08-03 NOTE — OP REPORT
PreOp Diagnosis:   1. SIUP @ 39w0d  2. History of c/s x2  3. Desires permanent sterilization     PostOp Diagnosis:   S/p repeat  delivery w/ bilateral tubal ligation     Procedure(s):  REPEAT C SECTION - Wound Class: Clean Contaminated     Surgeon(s):  Tamia Carbone M.D.     Anesthesiologist/Type of Anesthesia:  Anesthesiologist: King Olivares M.D./Spinal     Surgical Staff:  Circulator: Catherine Haas R.N.  Scrub Person: Xenia Tejeda  L&D Baby  Nurse: Isabella Antunez R.N.     Specimens removed if any:  Right and left tubal segments sent to pathology     Estimated Blood Loss: 800cc  IVF: 1700cc LR  UOP: 100cc     Findings:   Vigorous female infant, vertex, OA position, AGPARs 8/8, wt 3887g  Normal uterus, tubes and ovaries  Moderate adhesions of the subcutaneous tissue, rectus muscles and peritoneum.  Minimal intraperitoneal adhesions noted.     Complications: none     Indication: 27yo  @39w0d admitted for scheduled repeat c/s with desired permanent sterilization.      Procedure in Detail:  The pt ws taken to the operating room where spinal anesthesia was placed and found to be adequate.  The patient was prepped and draped in a normal supine position with a wedge under the right hip.  A pfannensteil incision was made through the prior incision site and the scalpel was used around the existing keloid in elliptical fashion and wedged out with the bovie.  The incision was taken down to the fascia with the Bovie, which was incised bilaterally with the Bovie.  The fascial incision was extended laterally with the Pickens scissors.  The rectus muscles were dissected off the rectus sheath with Pickens scissors superiorly and inferiorly.  There was separation of the rectus sheath superiorly and the peritoneum was entered bluntly, extended with stretching and Bovie.  The bladder blade was placed.  The bladder flap was developed with Metzenbaum scissors and the bladder blade replaced.  The  lower uterine segment was incised transversely until the endometrial cavity was entered.  The membranes were ruptured with Allis clamp and clear fluid was noted.  The hysterotomy was extended with cephalad-caudad stretching.  The infant's head was manually elevated to the level of the hysterotomy and delivered, followed atraumatically by the anterior shoulder, posterior shoulder and body with help of fundal pressure.  The infant was stimulated, bulb suctioned, and cord clamping was delayed x60 seconds then the cord was clamped x2, cut and the infant handed to waiting nurses.  Cord gases were not sent.  The placenta delivered with gentle cord traction and uterine massage and was noted to be intact with 3-vessel cord. The uterus was exteriorized, cleared of all clots and debris and closed in 1 layers with 0-chromic.  Several additional figures of 8 were placed to the left of midline. Attention was turned to the tubal ligation.  The left tube was grasped with Altaf clamp and a small incision made in the mesosalpinx with the bovie.  2 ties of 0-plain were used to perform a modified Gunnison tubal ligation and an approximately 1cm long segment of left tube was excised and sent to pathology.  The right tube was similarly ligated.  The hysterotomy was re-examined and noted to be hemostatic.    The uterus was returned to the abdomen, the gutters gently cleared of all clots and debris and the hysterotomy re-examined and noted to be hemostatic.  The bilateral tubal ligation sites were re-examined and found to be hemostatic.  The rectus muscles were inspected, and found to have some bleeding.  On the medial aspect of the left rectus a single figure of 8 with 0-chromic was placed with hemostasis noted. The superior aspect of the right rectus just under the junction with the fascia was noted to be bleeding requiring 2 figures of 8 using 0-chromic.  Hemostasis was noted, kimberly was placed over this area secondary to raw muscle,  there was not active bleeding.  The fascia was closed with 0 PDS.  The subcutaneous tissues was irrigated. Bleeders were coagulated with the bovie.  The subcutaneous tissues were approximated with running 2-0 plain and the skin with subcuticular suture with 4-0 monocryl, steri strips were placed followed by pressure dressing.  All counts were correct.  Pt went to recovery in good condition with the infant who was admitted to the  nursery.        Anticipate routine postoperative care.    Fernando Carbone M.D.

## 2018-08-03 NOTE — H&P
OB H&P:    CC: scheduled c/s     HPI:  Ms. Faith Pascual is a 28 y.o.  @ 39w0d here for preop visit for repeat c/s w/ BTL.  Doing well today.     Contractions: rare, not painful  Loss of fluid: No   Vaginal bleeding: No   Fetal movement: +     PNC w/ TPC since 26wks  Pt had referral to Dr. Gaona for possible CNS abnormality (lateral ventricles at upper limit of normal and cerebral ventricle mildly prominent in appearance), US w/ Dr. Gaona showed normal anatomy.     PNL: Rh+/-, RI, RPR NR, HBsAg neg, HIV neg, GBS neg           ROS:  Const: denies fevers, general concerns  CV/resp: reports no concerns  GI: denies abd pain, GI concerns  : see HPI  Heme: no hx of blood transfusion                      OB History    Para Term  AB Living   3 2   2   2   SAB TAB Ectopic Molar Multiple Live Births             2       # Outcome Date GA Lbr Hayden/2nd Weight Sex Delivery Anes PTL Lv   3 Current                     2  / 35w0d   2.2 kg (4 lb 13.6 oz) F CS-Unspec   N ILENE   1  / 36w0d   3.9 kg (8 lb 9.6 oz) M CS-Unspec   N ILENE       reports c/s for fetal distress.    Denies complications with her surgeries.     GYN: pap NILM, no STIs     Past Medical History        Past Medical History:   Diagnosis Date   • Fibroids 2018            PSHx: prior c/s x2  Meds: PNV        FamHx: denies cancers, medical problems     Social History   Social History            Social History   • Marital status:        Spouse name: N/A   • Number of children: N/A   • Years of education: N/A          Occupational History   • Not on file.           Social History Main Topics   • Smoking status: Never Smoker   • Smokeless tobacco: Never Used   • Alcohol use No   • Drug use: No   • Sexual activity: Not Currently           Other Topics Concern   • Not on file          Social History Narrative   • No narrative on file            PE: (18)  /64   Wt 78.9 kg (174 lb)   BMI 30.82 kg/m²    gen: AAO, NAD  CV: RRR  Resp: ctab  abd: soft, gravid, NT, fh 39, FHTs 140 , cephalic  Ext: NT,  edema        A/P: 28 y.o.  @ 39w0d by pt reported DNUIA c/w 28wk US with hx of c/s x2  - plan for repeat c/s w/ BTL, BTL consent signed  and under media tab; pt confirms she does not desire more chidlren  - discussed risks of procedure including pain/bleeding/infection/damage to intraabdominal structures (bladder/intestines). Pt confirms she is OK with transfusion in case of emergency     Tamia Carbone MD  [facility], Women's Health    0930:   re-reviewed with phone Lithuanian interpretor #[id number] consent for surgery and sterilization.  Ensured that pt is sure she desires no more children . Discussed risk of sterilization failure/pregnancy after tubal ligation.  Discussed risks of pain/bleeding/infection, damage to intraabdominal structures including intestines/bladder/ureters, pt confirmed she is accepting of transfusion in case of emergency.  All questions answered.    Tamia Carbone MD  [facility], Women's Health

## 2018-08-03 NOTE — PROGRESS NOTES
1410 - pt transferred to postpartum via [place] in stable condition with personal belongings. Report given to Melissa STONE. POC discussed.

## 2018-08-03 NOTE — PROGRESS NOTES
Pt with bleeding, fundus firm, no active bleeding noted during fundal massage by CHASE Staples. Pt states pain 6/10. Anesthesia called for additional pain med and order to give 1 noro (5/325mg) at this time and to call back if that does not decrease pain level.

## 2018-08-04 LAB
ERYTHROCYTE [DISTWIDTH] IN BLOOD BY AUTOMATED COUNT: 49.9 FL (ref 35.9–50)
HCT VFR BLD AUTO: 29.9 % (ref 37–47)
HGB BLD-MCNC: 9.9 G/DL (ref 12–16)
MCH RBC QN AUTO: 29.3 PG (ref 27–33)
MCHC RBC AUTO-ENTMCNC: 33.1 G/DL (ref 33.6–35)
MCV RBC AUTO: 88.5 FL (ref 81.4–97.8)
MORPHOLOGY BLD-IMP: NORMAL
PLATELET # BLD AUTO: 123 K/UL (ref 164–446)
PMV BLD AUTO: 13.1 FL (ref 9–12.9)
RBC # BLD AUTO: 3.38 M/UL (ref 4.2–5.4)
WBC # BLD AUTO: 12.2 K/UL (ref 4.8–10.8)

## 2018-08-04 PROCEDURE — A9270 NON-COVERED ITEM OR SERVICE: HCPCS | Performed by: OBSTETRICS & GYNECOLOGY

## 2018-08-04 PROCEDURE — 85027 COMPLETE CBC AUTOMATED: CPT

## 2018-08-04 PROCEDURE — 36415 COLL VENOUS BLD VENIPUNCTURE: CPT

## 2018-08-04 PROCEDURE — 700102 HCHG RX REV CODE 250 W/ 637 OVERRIDE(OP): Performed by: STUDENT IN AN ORGANIZED HEALTH CARE EDUCATION/TRAINING PROGRAM

## 2018-08-04 PROCEDURE — A9270 NON-COVERED ITEM OR SERVICE: HCPCS | Performed by: STUDENT IN AN ORGANIZED HEALTH CARE EDUCATION/TRAINING PROGRAM

## 2018-08-04 PROCEDURE — 700111 HCHG RX REV CODE 636 W/ 250 OVERRIDE (IP): Performed by: ANESTHESIOLOGY

## 2018-08-04 PROCEDURE — 700102 HCHG RX REV CODE 250 W/ 637 OVERRIDE(OP): Performed by: OBSTETRICS & GYNECOLOGY

## 2018-08-04 PROCEDURE — 700112 HCHG RX REV CODE 229: Performed by: OBSTETRICS & GYNECOLOGY

## 2018-08-04 PROCEDURE — 770002 HCHG ROOM/CARE - OB PRIVATE (112)

## 2018-08-04 RX ORDER — OXYCODONE HYDROCHLORIDE 10 MG/1
10 TABLET ORAL EVERY 4 HOURS PRN
Status: DISCONTINUED | OUTPATIENT
Start: 2018-08-04 | End: 2018-08-06 | Stop reason: HOSPADM

## 2018-08-04 RX ORDER — FERROUS SULFATE 325(65) MG
325 TABLET ORAL 2 TIMES DAILY WITH MEALS
Status: DISCONTINUED | OUTPATIENT
Start: 2018-08-04 | End: 2018-08-06 | Stop reason: HOSPADM

## 2018-08-04 RX ORDER — IBUPROFEN 600 MG/1
600 TABLET ORAL EVERY 6 HOURS PRN
Status: DISCONTINUED | OUTPATIENT
Start: 2018-08-04 | End: 2018-08-06 | Stop reason: HOSPADM

## 2018-08-04 RX ORDER — KETOROLAC TROMETHAMINE 30 MG/ML
30 INJECTION, SOLUTION INTRAMUSCULAR; INTRAVENOUS EVERY 6 HOURS
Status: DISCONTINUED | OUTPATIENT
Start: 2018-08-04 | End: 2018-08-04

## 2018-08-04 RX ORDER — OXYCODONE HYDROCHLORIDE 5 MG/1
5 TABLET ORAL EVERY 4 HOURS PRN
Status: DISCONTINUED | OUTPATIENT
Start: 2018-08-04 | End: 2018-08-06 | Stop reason: HOSPADM

## 2018-08-04 RX ADMIN — IBUPROFEN 600 MG: 600 TABLET, FILM COATED ORAL at 21:52

## 2018-08-04 RX ADMIN — KETOROLAC TROMETHAMINE 30 MG: 30 INJECTION, SOLUTION INTRAMUSCULAR at 12:01

## 2018-08-04 RX ADMIN — Medication 325 MG: at 08:43

## 2018-08-04 RX ADMIN — Medication 325 MG: at 18:23

## 2018-08-04 RX ADMIN — DOCUSATE SODIUM 100 MG: 100 CAPSULE, LIQUID FILLED ORAL at 18:24

## 2018-08-04 RX ADMIN — DOCUSATE SODIUM 100 MG: 100 CAPSULE, LIQUID FILLED ORAL at 05:50

## 2018-08-04 RX ADMIN — OXYCODONE HYDROCHLORIDE 10 MG: 10 TABLET ORAL at 21:57

## 2018-08-04 RX ADMIN — Medication 1 TABLET: at 05:50

## 2018-08-04 RX ADMIN — KETOROLAC TROMETHAMINE 30 MG: 30 INJECTION, SOLUTION INTRAMUSCULAR at 00:46

## 2018-08-04 RX ADMIN — KETOROLAC TROMETHAMINE 30 MG: 30 INJECTION, SOLUTION INTRAMUSCULAR at 06:00

## 2018-08-04 ASSESSMENT — PAIN SCALES - GENERAL
PAINLEVEL_OUTOF10: 0
PAINLEVEL_OUTOF10: 0
PAINLEVEL_OUTOF10: 6

## 2018-08-04 NOTE — CARE PLAN
Problem: Communication  Goal: The ability to communicate needs accurately and effectively will improve  Outcome: PROGRESSING AS EXPECTED  Pt updated on plan of care via . Questions answered and in agreement with plan.     Problem: Infection  Goal: Will remain free from infection  Outcome: PROGRESSING AS EXPECTED  Pt with normal vital signs and no signs of infection.

## 2018-08-04 NOTE — PROGRESS NOTES
Received bedside report from CHASE Torres. Discussed plan of care. Patient will call for pain medication as needed. All needs met at this time.

## 2018-08-04 NOTE — PROGRESS NOTES
[facility] POSTPARTUM PROGRESS NOTE    PATIENT ID:  NAME:  Faith Pascual  MRN:               [id number]  YOB: 1990     28 y.o. female admitted  now  at 39w1d POD#1 s/p repeat c/s with BTL      Subjective:   Abdominal pain: yes, mild   Ambulating: yes  tolerating liquids: yes  tolerating regular diet: yes  Flatus: no  BM: yes  Bleeding: yes, light lochia  Voiding: yes  Dizziness: no  Breast feeding: yes  Breast tenderness: no    Objective:    Vitals:    18 0100 18 0200 18 0300 18 0400   BP:    104/56   Pulse: 100 (!) 101 83 (!) 107   Resp: 16 16 16 16   Temp:    36.6 °C (97.8 °F)   TempSrc:       SpO2: 95% 92% 96% 93%   Weight:       Height:         General: No acute distress, resting comfortably in bed.  HEENT: normocephalic, nontraumatic, PERRLA, EOMI  Cardiovascular: Heart RRR with no murmurs, rubs or gallops. Distal Pulses 2+  Respiratory: symmetric chest expansion, lungs CTA bilaterally with no wheezes rales or rhonci  Abdomen: soft, mildly tender around incision which is covered with dressing, fundus firm, +BS  Genitourinary: lochia light, denies excessive vaginal bleeding  Musculoskeletal: strength 5/5 in four extremities  Neuro: non focal with no numbness, tingling or changes in sensation      Recent Labs      18   0810  18   0353   WBC  8.1  12.2*   RBC  4.56  3.38*   HEMOGLOBIN  12.9  9.9*   HEMATOCRIT  39.8  29.9*   MCV  87.3  88.5   MCH  28.3  29.3   RDW  49.1  49.9   PLATELETCT  141*  123*   MPV  12.9  13.1*   NEUTSPOLYS  65.50   --    LYMPHOCYTES  25.70   --    MONOCYTES  7.60   --    EOSINOPHILS  0.50   --    BASOPHILS  0.20   --      No results for input(s): SODIUM, POTASSIUM, CHLORIDE, CO2, GLUCOSE, BUN, CPKTOTAL in the last 72 hours.    Current Meds:   Current Facility-Administered Medications   Medication Dose Frequency Provider Last Rate Last Dose   • LR infusion   PRN Tamia Carbone M.D.       • docusate sodium (COLACE)  capsule 100 mg  100 mg BID Tamia Carbone M.D.   100 mg at 18 0550   • magnesium hydroxide (MILK OF MAGNESIA) suspension 30 mL  30 mL Q6HRS PRN Tamia Carbone M.D.       • simethicone (MYLICON) chewable tab 80 mg  80 mg 4X/DAY PRN Tamia Carbone M.D.       • prenatal plus vitamin (STUARTNATAL 1+1) 27-1 MG tablet 1 Tab  1 Tab QAM Tamia Carbone M.D.   1 Tab at 18 0550   • naloxone (NARCAN) injection 0.1 mg  0.1 mg PRN King Olivares M.D.       • ketorolac (TORADOL) injection 30 mg  30 mg Q6HRS King Olivares M.D.   30 mg at 18 0600   • oxyCODONE-acetaminophen (PERCOCET) 5-325 MG per tablet 1 Tab  1 Tab Q4HRS PRN King Olivares M.D.   1 Tab at 18 1627   • oxyCODONE-acetaminophen (PERCOCET) 5-325 MG per tablet 2 Tab  2 Tab Q4HRS PRN King Olivares M.D.       • ondansetron (ZOFRAN) syringe/vial injection 4 mg  4 mg Q6HRS PRN King Olivares M.D.       • diphenhydrAMINE (BENADRYL) injection 12.5 mg  12.5 mg Q6HRS PRN King Olivares M.D.         Last reviewed on 8/3/2018  8:24 AM by Diana Morrison R.N.        Assessment:  28 y.o. female admitted  now  at 39w1d POD#1 s/p repeat c/s with BTL    Plan:   1. Hg 9.9 PO Iron supplementation   Continue routine postpartum care, encourage ambulation, pain control, anticipate D/C home on POD#3    Annalee Cornelius M.D.

## 2018-08-04 NOTE — CARE PLAN
Problem: Communication  Goal: The ability to communicate needs accurately and effectively will improve  Outcome: PROGRESSING AS EXPECTED  Plan of care reviewed with mother via .     Problem: Pain Management  Goal: Pain level will decrease to patient's comfort goal  Outcome: PROGRESSING AS EXPECTED  Pain control with toradol scheduled every 6 hours and PRN norco.

## 2018-08-04 NOTE — CONSULTS
Infant Derrell+ under phototherapy in nursery, coming out for breastfeeding, mother reports infant is sleepy at breast but denies any pain or discomfort with feedings, declines breastfeeding assistance. Plans to breastfeed at home exclusively if she is producing enough milk, recommended initiating pumping to help establish milk supply as infant is receiving donor milk supplementation. Reviewed pump use and settings, encouraged to let RN know when she is ready to pump to ensure proper flange fit, mother has used manual breast pump in the past. Plan to breast feed and follow with pumping and supplementation each feeding. Instructed to call [facility] Monday for breast pump rental if still indicated.  line used for consultation. Lactation to follow as needed.

## 2018-08-05 PROCEDURE — 700102 HCHG RX REV CODE 250 W/ 637 OVERRIDE(OP): Performed by: OBSTETRICS & GYNECOLOGY

## 2018-08-05 PROCEDURE — 700102 HCHG RX REV CODE 250 W/ 637 OVERRIDE(OP): Performed by: STUDENT IN AN ORGANIZED HEALTH CARE EDUCATION/TRAINING PROGRAM

## 2018-08-05 PROCEDURE — A9270 NON-COVERED ITEM OR SERVICE: HCPCS | Performed by: STUDENT IN AN ORGANIZED HEALTH CARE EDUCATION/TRAINING PROGRAM

## 2018-08-05 PROCEDURE — A9270 NON-COVERED ITEM OR SERVICE: HCPCS | Performed by: OBSTETRICS & GYNECOLOGY

## 2018-08-05 PROCEDURE — 770002 HCHG ROOM/CARE - OB PRIVATE (112)

## 2018-08-05 PROCEDURE — 700112 HCHG RX REV CODE 229: Performed by: OBSTETRICS & GYNECOLOGY

## 2018-08-05 RX ADMIN — IBUPROFEN 600 MG: 600 TABLET, FILM COATED ORAL at 08:49

## 2018-08-05 RX ADMIN — DOCUSATE SODIUM 100 MG: 100 CAPSULE, LIQUID FILLED ORAL at 16:59

## 2018-08-05 RX ADMIN — Medication 325 MG: at 08:49

## 2018-08-05 RX ADMIN — Medication 325 MG: at 16:58

## 2018-08-05 RX ADMIN — DOCUSATE SODIUM 100 MG: 100 CAPSULE, LIQUID FILLED ORAL at 08:48

## 2018-08-05 RX ADMIN — IBUPROFEN 600 MG: 600 TABLET, FILM COATED ORAL at 16:58

## 2018-08-05 RX ADMIN — Medication 1 TABLET: at 08:50

## 2018-08-05 ASSESSMENT — PAIN SCALES - GENERAL
PAINLEVEL_OUTOF10: 5
PAINLEVEL_OUTOF10: 3
PAINLEVEL_OUTOF10: 5

## 2018-08-05 NOTE — PROGRESS NOTES
Assumed care @ 0715. Resting in bed and in no distress. Bed in low position, call light w/in reach.    0845 Assessed and medicated for pain. Translation services ([place]) utilized to discuss plan of care and concerns. Pt c/o gas pains, encr to ambulate in halls. Pt also aware re:infant feedings and nursery care status. Verbalized understanding.

## 2018-08-05 NOTE — PROGRESS NOTES
Post Partum Progress Note    Name:   Faith Pascual   Date/Time:  2018 - 7:09 AM  Chief Admitting Dx:  Pregnancy  PREV C SECTION, 39 WEEKS  Labor and delivery, indication for care  Labor and delivery, indication for care  Delivery Type:   for repeat  Post-Op/Post Partum Days #:  2    Subjective:  Abdominal pain: no  Ambulating:   yes  Tolerating liquids:  yes  Tolerating food:  yes common adult  Flatus:   yes  BM:    no  Bleeding:   without any bleeding  Voiding:   yes  Dizziness:   no  Feeding:   both breast and bottle - Similac with iron    Vitals:    18 0400 18 0808 18 2000 18 0645   BP: 104/56 101/64 109/68 105/65   Pulse: (!) 107 81 62 80   Resp: 16 16 18 16   Temp: 36.6 °C (97.8 °F) 36.3 °C (97.3 °F) 36.3 °C (97.4 °F) 36.6 °C (97.9 °F)   TempSrc:       SpO2: 93% 93% 96% 97%   Weight:       Height:           Exam:  Breast: Tenderness no, Engorged no and Lactating yes  Abdomen: Abdomen soft, non-tender. BS normal. No masses,  No organomegaly  Fundal Tenderness:  no  Fundus Firm: yes  Incision: dry and intact  Below umbilicus: yes  Perineum: perineum intact  Lochia: mild  Extremities: Normal, trace extremities, peripheral pulses and reflexes normal, no edema, redness or tenderness in the calves or thighs, feet normal, good pulses, normal color, temperature and sensation    Meds:  Current Facility-Administered Medications   Medication Dose   • ibuprofen (MOTRIN) tablet 600 mg  600 mg   • oxyCODONE immediate-release (ROXICODONE) tablet 5 mg  5 mg   • oxyCODONE immediate release (ROXICODONE) tablet 10 mg  10 mg   • ferrous sulfate tablet 325 mg  325 mg   • LR infusion     • docusate sodium (COLACE) capsule 100 mg  100 mg   • magnesium hydroxide (MILK OF MAGNESIA) suspension 30 mL  30 mL   • simethicone (MYLICON) chewable tab 80 mg  80 mg   • prenatal plus vitamin (STUARTNATAL 1+1) 27-1 MG tablet 1 Tab  1 Tab       Labs:   Recent Labs      18   0810  18   0353    WBC  8.1  12.2*   RBC  4.56  3.38*   HEMOGLOBIN  12.9  9.9*   HEMATOCRIT  39.8  29.9*   MCV  87.3  88.5   MCH  28.3  29.3   MCHC  32.4*  33.1*   RDW  49.1  49.9   PLATELETCT  141*  123*   MPV  12.9  13.1*       Assessment:  Chief Admitting Dx:  Pregnancy  PREV C SECTION, 39 WEEKS  Labor and delivery, indication for care  Labor and delivery, indication for care  Delivery Type:   for repeat  Tubal Ligation:  yes    Plan:  Continue routine post partum care.  Milk of Magnesium for constipation  Anticipate discharge POD#3    CONCHITA RendonNRITA.

## 2018-08-05 NOTE — PROGRESS NOTES
1900 Received bedside report from CHASE Torres. Discussed plan of care. Patient will call for pain medication as needed. All needs met at this time.

## 2018-08-06 VITALS
HEIGHT: 61 IN | RESPIRATION RATE: 18 BRPM | SYSTOLIC BLOOD PRESSURE: 105 MMHG | HEART RATE: 77 BPM | BODY MASS INDEX: 32.85 KG/M2 | OXYGEN SATURATION: 96 % | WEIGHT: 174 LBS | TEMPERATURE: 97.7 F | DIASTOLIC BLOOD PRESSURE: 71 MMHG

## 2018-08-06 PROCEDURE — 700112 HCHG RX REV CODE 229: Performed by: OBSTETRICS & GYNECOLOGY

## 2018-08-06 PROCEDURE — A9270 NON-COVERED ITEM OR SERVICE: HCPCS | Performed by: OBSTETRICS & GYNECOLOGY

## 2018-08-06 PROCEDURE — 700102 HCHG RX REV CODE 250 W/ 637 OVERRIDE(OP): Performed by: STUDENT IN AN ORGANIZED HEALTH CARE EDUCATION/TRAINING PROGRAM

## 2018-08-06 PROCEDURE — 700102 HCHG RX REV CODE 250 W/ 637 OVERRIDE(OP): Performed by: OBSTETRICS & GYNECOLOGY

## 2018-08-06 PROCEDURE — A9270 NON-COVERED ITEM OR SERVICE: HCPCS | Performed by: STUDENT IN AN ORGANIZED HEALTH CARE EDUCATION/TRAINING PROGRAM

## 2018-08-06 RX ORDER — OXYCODONE HYDROCHLORIDE 5 MG/1
5 TABLET ORAL EVERY 4 HOURS PRN
Qty: 30 TAB | Refills: 0 | Status: SHIPPED | OUTPATIENT
Start: 2018-08-06 | End: 2018-08-13

## 2018-08-06 RX ORDER — IBUPROFEN 800 MG/1
800 TABLET ORAL EVERY 8 HOURS PRN
Qty: 30 TAB | Refills: 1 | Status: SHIPPED | OUTPATIENT
Start: 2018-08-06

## 2018-08-06 RX ADMIN — IBUPROFEN 600 MG: 600 TABLET, FILM COATED ORAL at 09:19

## 2018-08-06 RX ADMIN — Medication 1 TABLET: at 05:48

## 2018-08-06 RX ADMIN — Medication 325 MG: at 09:19

## 2018-08-06 RX ADMIN — OXYCODONE HYDROCHLORIDE 5 MG: 5 TABLET ORAL at 00:41

## 2018-08-06 RX ADMIN — OXYCODONE HYDROCHLORIDE 5 MG: 5 TABLET ORAL at 05:48

## 2018-08-06 RX ADMIN — DOCUSATE SODIUM 100 MG: 100 CAPSULE, LIQUID FILLED ORAL at 05:48

## 2018-08-06 RX ADMIN — IBUPROFEN 600 MG: 600 TABLET, FILM COATED ORAL at 16:44

## 2018-08-06 RX ADMIN — IBUPROFEN 600 MG: 600 TABLET, FILM COATED ORAL at 00:41

## 2018-08-06 RX ADMIN — OXYCODONE HYDROCHLORIDE 5 MG: 5 TABLET ORAL at 16:44

## 2018-08-06 ASSESSMENT — PAIN SCALES - GENERAL
PAINLEVEL_OUTOF10: 4
PAINLEVEL_OUTOF10: 6
PAINLEVEL_OUTOF10: 7
PAINLEVEL_OUTOF10: 5
PAINLEVEL_OUTOF10: 3
PAINLEVEL_OUTOF10: 2

## 2018-08-06 ASSESSMENT — PATIENT HEALTH QUESTIONNAIRE - PHQ9
2. FEELING DOWN, DEPRESSED, IRRITABLE, OR HOPELESS: NOT AT ALL
1. LITTLE INTEREST OR PLEASURE IN DOING THINGS: NOT AT ALL
SUM OF ALL RESPONSES TO PHQ9 QUESTIONS 1 AND 2: 0

## 2018-08-06 NOTE — CARE PLAN
Problem: Alteration in comfort related to surgical incision and/or after birth pains  Goal: Patient is able to ambulate, care for self and infant with acceptable pain level  Patient is able to ambulate    Problem: Potential knowledge deficit related to lack of understanding of self and  care  Goal: Patient will verbalize understanding of self and infant care  Patient verbalizes understanding to care for self and infant.

## 2018-08-06 NOTE — DISCHARGE SUMMARY
Discharge Summary:      Faith Pascual      Admit Date:   8/3/2018  Discharge Date:  2018     Admitting diagnosis:  Pregnancy  PREV C SECTION, 39 WEEKS  Labor and delivery, indication for care  Labor and delivery, indication for care  Discharge Diagnosis: Status post  for repeat.  Pregnancy Complications: none  Tubal Ligation:  yes        History:  Past Medical History:   Diagnosis Date   • Fibroids 2018     OB History    Para Term  AB Living   3 2   2   2   SAB TAB Ectopic Molar Multiple Live Births             2      # Outcome Date GA Lbr Hayden/2nd Weight Sex Delivery Anes PTL Lv   3 Current            2  16 35w0d  2.2 kg (4 lb 13.6 oz) F CS-Unspec  N ILENE   1  12 36w0d  3.9 kg (8 lb 9.6 oz) M CS-Unspec  N ILENE           Patient has no known allergies.  Patient Active Problem List    Diagnosis Date Noted   • Borderline thrombocytopenia affecting pregnancy  2018   • History of  delivery x 2 - 35, 36wk 2018   • Abnormal pregnancy US - PANN referral for CNS abnormalities seen on US 2018   • Supervision of other high risk pregnancies, third trimester 2018   • History of C/S x 2 - desires repeat, BTL (Consent signed 18) 2018        Hospital Course:   28 y.o. , now para 3, was admitted with the above mentioned diagnosis, underwent Repeat  repeat,  for repeat. Patient postpartum course was unremarkable, with progressive advancement in diet , ambulation and toleration of oral analgesia. Patient without complaints today and desires discharge.      Vitals:    18 0808 18 0645 18   BP: 101/64 109/68 105/65 111/71   Pulse: 81 62 80 93   Resp:    Temp: 36.3 °C (97.3 °F) 36.3 °C (97.4 °F) 36.6 °C (97.9 °F) 37.4 °C (99.4 °F)   TempSrc:       SpO2: 93% 96% 97% 98%   Weight:       Height:           Current Facility-Administered Medications    Medication Dose   • ibuprofen (MOTRIN) tablet 600 mg  600 mg   • oxyCODONE immediate-release (ROXICODONE) tablet 5 mg  5 mg   • oxyCODONE immediate release (ROXICODONE) tablet 10 mg  10 mg   • ferrous sulfate tablet 325 mg  325 mg   • LR infusion     • docusate sodium (COLACE) capsule 100 mg  100 mg   • magnesium hydroxide (MILK OF MAGNESIA) suspension 30 mL  30 mL   • simethicone (MYLICON) chewable tab 80 mg  80 mg   • prenatal plus vitamin (STUARTNATAL 1+1) 27-1 MG tablet 1 Tab  1 Tab       Exam:  Breast Exam: negative  Abdomen: Abdomen soft, non-tender. BS normal. No masses,  No organomegaly  Fundus Non Tender: yes  Incision: no evidence of infection, separation or keloid formation.  Perineum: perineum intact  Extremity: extremities, peripheral pulses and reflexes normal     Labs:  Recent Labs      08/03/18   0810  08/04/18   0353   WBC  8.1  12.2*   RBC  4.56  3.38*   HEMOGLOBIN  12.9  9.9*   HEMATOCRIT  39.8  29.9*   MCV  87.3  88.5   MCH  28.3  29.3   MCHC  32.4*  33.1*   RDW  49.1  49.9   PLATELETCT  141*  123*   MPV  12.9  13.1*        Activity:   Discharge to home  Pelvic Rest x 6 weeks    Assessment:  normal postpartum course  Discharge Assessment: No areas of skin breakdown/redness; surgical incision intact/healing     Follow up: .[facility] or [facility] in 5 weeks for vaginal ; 1 week for incision check.      Discharge Meds:   No current outpatient prescriptions on file.       Tisha Humphrey M.D.

## 2018-08-06 NOTE — DISCHARGE INSTRUCTIONS
Cuidados en el postparto luego de un parto por cesárea  (Postpartum Care After  Delivery)  El período de tiempo que sigue inmediatamente al parto se conoce melchor puerperio.  ¿QUÉ TIPO DE ATENCIÓN MÉDICA RECIBIRÉ?  · Podría continuar recibiendo medicamentos y líquidos través de ernesto vía intravenosa (IV) que se colocará en ernesto de denisse venas.  · Probablemente tenga colocado un tubo flexible de drenaje (catéter) que drenará la orina de la vejiga. El catéter será retirado tan pronto melchor sea apropiado.  · Es posible que le den ernesto botella rociadora para que use cuando vaya al baño. Puede utilizarla hasta que se sienta cómoda limpiándose de la manera habitual. Siga los pasos a continuación para usar la botella rociadora:  ¨ Antes de orinar, llene la botella rociadora con agua tibia. El agua debe estar tibia. No use [place].  ¨ Después de orinar, mientras aún está sentada en el inodoro, use la botella rociadora para enjuagar el área alrededor de la uretra y la abertura vaginal. Con esto podrá limpiar cualquier rastro de orina y ann.  ¨ Puede hacer esto en lugar de secarse. Cuando comience a sanar, podrá usar la botella rociadora antes de secarse. Asegúrese de secarse suavemente.  ¨ Llene la botella rociadora con agua limpia cada vez que vaya al baño.  · Deberá usar apósitos sanitarios.  · Le controlarán la incisión para asegurarse de que esté cicatrizando correctamente. Le indicarán cuando es seguro retirar los puntos, las grapas o la cinta adhesiva para la piel.  ¿QUÉ PUEDO ESPERAR?  · Quizás no tenga necesidad de orinar tariq varias horas después del parto.  · Sentirá algo de dolor y molestias en el abdomen. Puede tener ernesto pequeña secreción de ann o de líquido transparente que proviene de la incisión.  · Si está amamantando, podría tener contracciones uterinas cada vez que lo marisa. Estas podrían prolongarse hasta varias semanas tariq el puerperio. Las contracciones uterinas ayudan al útero a  regresar a duncan tamaño habitual.  · Es normal tener un poco de hemorragia vaginal (loquios) después del parto. La cantidad y apariencia de los loquios a menudo es similar a las del período menstrual la primera semana después del parto. Disminuirá gradualmente las siguientes semanas hasta convertirse en ernesto descarga seca amarronada o amarillenta. En la mayoría de las mujeres, los loquios se detienen completamente entre 6 a 8 semanas después del parto. Los sangrados vaginales pueden variar de roula a roula.  · Los primeros días después del parto, podría padecer congestión mamaria. Los pechos se sentirán pesados, llenos y molestos. Las mamas también podrían latir y ponerse duras, muy tirantes, calientes y sensibles al tacto. Cuando esto ocurra, podría notar leche que se escapa de los senos. El médico puede recomendarle algunos métodos para aliviar amanda malestar causado por la congestión mamaria. La congestión mamaria debería desaparecer al cabo de unos días.  · Podría sentirse más deprimida o preocupada que lo habitual debido a los cambios hormonales luego del parto. Estos sentimientos no deben durar más de unos pocos días. Si no desaparecen al cabo de algunos días, hable con duncan médico.  ¿QUÉ CUIDADOS RASHI TENER?  · Infórmele a duncan médico si siente dolor o malestar.  · Elsi suficiente agua para mantener la orina emilie o de color amarillo pálido.  · Lávese sathya las mel con agua y jabón tariq al menos 20 segundos después de cambiar el apósito sanitario, usar el baño y antes de sostener o alimentar al bebé.  · Si no está amamantando, evite tocarse mucho los senos. Al hacerlo, podrían producir más leche.  · Si se siente débil o mareada, o si siente que está a punto de desmayarse, pida ayuda antes de realizar lo siguiente:  ¨ Levantarse de la cama.  ¨ Ducharse.  · Cambie los apósitos sanitarios con frecuencia. Observe si hay cambios en el flujo, melchor un aumento repentino en el volumen, cambios en el color o coágulos  sanguíneos de gran tamaño. Si expulsa un coágulo sanguíneo por la vagina, guárdelo para mostrárselo a duncan médico. No tire la elkins sin que el médico examine el coágulo antes.  · Asegúrese de tener todas las vacunas al día. Cisne la ayudará a estar protegida y a proteger al bebé de determinadas enfermedades. Podría necesitar vacunas antes de dejar el hospital.  · Si lo desea, hable con el médico acerca de los métodos de planificación familiar o control de la natalidad (métodos anticonceptivos).  ¿CÓMO PUEDO ESTABLECER SOFÍA CON MI BEBÉ?  Pasar tanto tiempo melchor le sea posible con el bebé es sumamente importante. Crystal sylvie tiempo, usted y duncan bebé pueden conocerse y desarrollar sofía. Tener al bebé con usted en la habitación le dará tiempo de conocerlo. Cisne también puede hacerla sentir más cómoda para atender al bebé. Amamantar también puede ayudarla a crear sofía con el bebé.  ¿CÓMO PUEDO PLANIFICAR MI REGRESO A CASA CON EL BEBÉ?  · Asegúrese de tener instalada ernesto butaca en el automóvil.  ¨ La butaca debe contar con la certificación del fabricante para asegurarse de que esté instalada en forma rosado.  ¨ Asegúrese de que el bebé quede sathya asegurado en la butaca.  · Pregúntele al médico todo lo que necesite saber sobre los cuidados de duncan bebé. Asegúrese de poder comunicarse con el médico en miky de que tenga preguntas luego de dejar el hospital.  Esta información no tiene melchor fin reemplazar el consejo del médico. Asegúrese de hacerle al médico cualquier pregunta que tenga.  Document Released: 12/04/2013 Document Revised: 04/10/2017 Document Reviewed: 11/21/2016  Elsevier Interactive Patient Education © 2017 Elsevier Inc.    DISCHARGE INSTRUCTIONS ([place]) POSTPARTUM FOR MOM      JANET LAS REILLY:  · Antes de tocar el bebé.  · Antes del amamantamiento o darle al bebé lactancia artificial.  · Después de usar el baño.    CUIDADO DE LAS HERIDAS:  · La Incisión de la Operación Cesárea:  Mantenga limpea y seca, NO  levante nada que pesa más que duncan bebé por 6 semenas.  No debe ninguna apertura ni pus.  · Episiotomía/Charisma Vaginal:  Use un spray de Tucks o Dermoplast, tome un baño de asiento cuando necesite (6 pulgadas), siga usando la botella Monse hasta que pare de sangrar.    CUIDADA DEL SENO:  · Lleve un sostén.  · Si esta` amamantando no use jabón en el seno ni en los pezones.  · Aplique lanolina si los pezones se ponen quebrazados y si le duelen.    CUIDADO DE LA VAGINA:  · [place] puede entrar la vagina por 6 semanas:  Actividad sexual, Ducha vaginal, Tampones.  · El sangramiento puede seguir por 2 a 4 semanas.  La cantidad es variable.  Llame a duncan med`ico(a) obstetra si hay mucha ann (si usa ma`s de ernesto toalla femenina cada hora).    CONTRACEPCIÒN:  · Es posible embarazarse en cualquier tiempo despus del parto y mientras el amamantamiento.  · Debe planear de discutir los metodos de cantracepción con duncan médico(a) cuando vuelva en 6 semanas para duncan revisión médica.    DIETA Y DEFECACÒN:  · Para evitar la constipación coma mas fibra (cereal salvado, fruta, y verduras) Y tome muchos liquidos.   · Es común orinar frecuentemente después del parto.    POSTPARTO Y LA DEPRESÒN:  Crystal los primeros rausch después del parto es común sentirse:  · Impaciente, irritable, o llorar  Estos sentimientos llegan y van rapidamente.  No obstante, srikanth ernesto de cada macie mujeres tiene síntomas emocionales conocidos melchor depresión postparto.  · Despresión Postparto:  Puede empezar tan pronto melchor el shannon o tercer día después del parto o puede durar algunas semanas o meses para desarrollar.  Síntomas de la depresión pueden presentarse, ranjeet son más intensos:  · Pérdida del hambre  · Frecuencia de llorar  · Sentirse desesperada o perder el control  · Demasiada o no suficiente preocupación con duncan bebé  · Tener miedo de tocar el bebé  · No preocuparse con duncan propia apariencia  · Incapacidad de dormir o dormir excesivamente    DEPRESIÒN / RIESGO AL  SUICIDIO:    Cuando se le da de lesa de alguna entidad de [facility], es importante aprender a mantener a jm de hacerse daño a sí mismo.    Reconocer los signos de advertencia:  · Cambios bruscos en la peronalidad, positiva o negativa, incluyendo aumento de la energia  · Regalar posesiones  · Cambios en patrones de comer - significativos cambios de peso - positivos o negativos  · Cambio de patrones para dormir - no poder dormir o dormir todo el tiempo  · Falta de voluntad o incapacidad de comunicarse  · Depresión  · Gris, desánimo y tristeza inusual  · Habla de querer morir  · Descuido del aspecto personal  · Rebeldía - comportamiento imprudente  · Retiro de personas y actividades que les gusta  · Confusión-incapacidad para concentrarse    Si usted o un ser querido observa cualquiera de estos comportamientos o tiene preocupaciones de hacerse daño a si mismo, aquí le damos lo que usted puede hacer:  · Hablar de ti - tus sentimientos y razones para hacerse butch a si mismo  · Retire cualquier medio que se podría utilizar para lastimarse (ejemplos: píldoras, cuerdas, cordones de extensión, arma de christel)  · Obtenga ayuda profesional de la comunidad (umu mental, abuso de sustancias, orientación psicológica)  · No estar solo: llamar a un contacto seguro - ernesto persona que confía en que estará allí por usted  · Llamada local L´INEA de CRISIS [phone] y [phone]  · Llamada local [facility] [facility] [facility] Para Crisis [facility] [phone] or Your Style [employer]  · Llamada gratuita nacional, líneas de prevención del suicidio  · Preventión del Suicidio Nacional [facility] 473-775-UDJW (1842)  · Línea Nacional de la [place] de [place] 800-SUICIDE ([phone])       ([employer])      POSTPARTUM DISCHARGE INSTRUCTIONS FOR MOM    YOB: 1990   Age: 28 y.o.               Admit Date: 8/3/2018     Discharge Date: 8/6/2018  Attending Doctor:  JC Michaud*                   Allergies:  Patient has no known allergies.    Discharged to home by car. Discharged via wheelchair, hospital escort: Yes.  Special equipment needed: Not Applicable  Belongings with: Personal  Be sure to schedule a follow-up appointment with your primary care doctor or any specialists as instructed.     Discharge Plan:   Influenza Vaccine Indication: Indicated: Not available from distributor/    REASONS TO CALL YOUR OBSTETRICIAN:  1.   Persistent fever or shaking chills (Temperature higher than 100.4)  2.   Heavy bleeding (soaking more than 1 pad per hour); Passing clots  3.   Foul odor from vagina  4.   Mastitis (Breast infection; breast pain, chills, fever, redness)  5.   Urinary pain, burning or frequency  6.   Episiotomy infection  7.   Abdominal incision infection  8.   Severe depression longer than 24 hours    HAND WASHING  · Prior to handling the baby.  · Before breastfeeding or bottle feeding baby.  · After using the bathroom or changing the baby's diaper.    WOUND CARE  Ask your physician for additional care instructions.  In general:    ·  Incision:      · Keep clean and dry.    · Do NOT lift anything heavier than your baby for up to 6 weeks.    · There should not be any opening or pus.      VAGINAL CARE  · Nothing inside vagina for 6 weeks: no sexual intercourse, tampons or douching.  · Bleeding may continue for 2-4 weeks.  Amount may vary.    · Call your physician for heavy bleeding which means soaking more than 1 pad per hour    BIRTH CONTROL  · It is possible to become pregnant at any time after delivery and while breastfeeding.  · Plan to discuss a method of birth control with your physician at your follow up visit. visit.    DIET AND ELIMINATION  · Eating more fiber (bran cereal, fruits, and vegetables) and drinking plenty of fluids will help to avoid constipation.  · Urinary frequency after childbirth is normal.    POSTPARTUM BLUES  During the first few days after birth, you  "may experience a sense of the \"blues\" which may include impatience, irritability or even crying.  These feeling come and go quickly.  However, as many as 1 in 10 women experience emotional symptoms known as postpartum depression.    Postpartum depression:  May start as early as the second or third day after delivery or take several weeks or months to develop.  Symptoms of \"blues\" are present, but are more intense:  Crying spells; loss of appetite; feelings of hopelessness or loss of control; fear of touching the baby; over concern or no concern at all about the baby; little or no concern about your own appearance/caring for yourself; and/or inability to sleep or excessive sleeping.  Contact your physician if you are experiencing any of these symptoms.    Crisis Hotline:  · [place] Crisis Hotline:  3-036-BZMVHMB  Or 1-936.137.5243  · [place] Crisis Hotline:  1-595.961.8653  Or 431-322-6936    PREVENTING SHAKEN BABY:  If you are angry or stressed, PUT THE BABY IN THE CRIB, step away, take some deep breaths, and wait until you are calm to care for the baby.  DO NOT SHAKE THE BABY.  You are not alone, call a supporter for help.    · Crisis Call Center 24/7 crisis line 444-950-1689 or 1-501.701.1112  · You can also text them, text \"ANSWER\" to 721151    QUIT SMOKING/TOBACCO USE:  I understand the use of any tobacco products increases my chance of suffering from future heart disease and could cause other illnesses which may shorten my life. Quitting the use of tobacco products is the single most important thing I can do to improve my health. For further information on smoking / tobacco cessation call a Toll Free Quit Line at 1-455.372.5026 (*[place]) or 1-364.324.9844 (American Lung Association) or you can access the web based program at www.lungusa.org.    · [place] Tobacco Users Help Line:  (874) 671-2513       Toll Free: 1-673.202.9865  · Quit Tobacco Program [facility] " (752) 479-3730    DEPRESSION / SUICIDE RISK:  As you are discharged from this [facility] facility, it is important to learn how to keep safe from harming yourself.    Recognize the warning signs:  · Abrupt changes in personality, positive or negative- including increase in energy   · Giving away possessions  · Change in eating patterns- significant weight changes-  positive or negative  · Change in sleeping patterns- unable to sleep or sleeping all the time   · Unwillingness or inability to communicate  · Depression  · Unusual sadness, discouragement and loneliness  · Talk of wanting to die  · Neglect of personal appearance   · Rebelliousness- reckless behavior  · Withdrawal from people/activities they love  · Confusion- inability to concentrate     If you or a loved one observes any of these behaviors or has concerns about self-harm, here's what you can do:  · Talk about it- your feelings and reasons for harming yourself  · Remove any means that you might use to hurt yourself (examples: pills, rope, extension cords, firearm)  · Get professional help from the community (Mental Health, Substance Abuse, psychological counseling)  · Do not be alone:Call your Safe Contact- someone whom you trust who will be there for you.  · Call your local CRISIS HOTLINE 488-6204 or 133-892-4159  · Call your local Children's Mobile Crisis Response Team [place] (290) 677-9120 or www.[employer]  · Call the toll free National Suicide Prevention Hotlines   · National Suicide Prevention Lifeline 026-417-ALSU (4695)  · National Hope Line Network 800-SUICIDE (165-6259)    DISCHARGE SURVEY:  Thank you for choosing [facility].  We hope we provided you with very good care.  You may be receiving a survey in the mail.  Please fill it out.  Your opinion is valuable to us.    ADDITIONAL EDUCATIONAL MATERIALS GIVEN TO PATIENT:        My signature on this form indicates that:  1.  I have reviewed and understand the above  information  2.  My questions regarding this information have been answered to my satisfaction.  3.  I have formulated a plan with my discharge nurse to obtain my prescribed medication for home.

## 2018-08-06 NOTE — PROGRESS NOTES
Mother Vietnamese speaking, ipad  used. Baby is antionette +, 39.1 weeks, was in NB nursery now in room. Mother has been pumping and getting 2-3 ounces from each breast, with each pump session. Pump settings reviewed speed 80/60, suction 30% x 15 minutes, every 3 hours. Mother has Emergency Medicaid & has [facility]. Mother's breasts are full, discussed with mother if breasts become hard and painful, will need to use warm clothes or get in shower and hand express BM out to soften breasts. Hand expression demo done. Mother has lots of milk, gown wet spots with colostrum from leaking. Discussed with mother if baby able to latch deep frequently every 2-3 hours, she can stop pumping. Assisted baby to right breast using cross cradle hold, skin to skin, observed deep latch with coordinated suck & swallow heard, denies pain with latch.     Teaching on hunger cues, breastfeeding when baby shows cues or by 3 hours from last feed, importance of skin to skin, getting baby to open wide for deep latch, cluster feeding & positioning baby at breast. Discussed with mother if she needs a pump once home, she will need to contact [facility] and can get pump through them.     Breastfeeding POC:  Breastfeed on demand or by 3 hours from last feed, lots of skin to skin. F/U with [facility].

## 2018-08-06 NOTE — CARE PLAN
Problem: Communication  Goal: The ability to communicate needs accurately and effectively will improve  Outcome: PROGRESSING AS EXPECTED  Patient has been ambulating well, taking adequate PO fluids and voiding. Educated to feed infant q3h in [place] nursery using . All questions and concern answered.     Problem: Pain Management  Goal: Pain level will decrease to patient's comfort goal  Outcome: PROGRESSING AS EXPECTED  Patient likes to call for PRN pain medication as needed.

## 2018-08-15 ENCOUNTER — HOSPITAL ENCOUNTER (EMERGENCY)
Facility: MEDICAL CENTER | Age: 28
End: 2018-08-15
Attending: EMERGENCY MEDICINE
Payer: MEDICAID

## 2018-08-15 VITALS
RESPIRATION RATE: 16 BRPM | HEIGHT: 63 IN | OXYGEN SATURATION: 98 % | HEART RATE: 89 BPM | SYSTOLIC BLOOD PRESSURE: 117 MMHG | BODY MASS INDEX: 27.42 KG/M2 | DIASTOLIC BLOOD PRESSURE: 79 MMHG | TEMPERATURE: 96.9 F | WEIGHT: 154.76 LBS

## 2018-08-15 DIAGNOSIS — T14.8XXA WOUND INFECTION: ICD-10-CM

## 2018-08-15 DIAGNOSIS — L08.9 WOUND INFECTION: ICD-10-CM

## 2018-08-15 PROCEDURE — A9270 NON-COVERED ITEM OR SERVICE: HCPCS | Performed by: EMERGENCY MEDICINE

## 2018-08-15 PROCEDURE — 99283 EMERGENCY DEPT VISIT LOW MDM: CPT

## 2018-08-15 PROCEDURE — 700102 HCHG RX REV CODE 250 W/ 637 OVERRIDE(OP): Performed by: EMERGENCY MEDICINE

## 2018-08-15 RX ORDER — CEPHALEXIN 500 MG/1
500 CAPSULE ORAL ONCE
Status: COMPLETED | OUTPATIENT
Start: 2018-08-15 | End: 2018-08-15

## 2018-08-15 RX ORDER — CEPHALEXIN 500 MG/1
500 CAPSULE ORAL 4 TIMES DAILY
Qty: 28 CAP | Refills: 0 | Status: SHIPPED | OUTPATIENT
Start: 2018-08-15 | End: 2018-08-22

## 2018-08-15 RX ADMIN — CEPHALEXIN 500 MG: 500 CAPSULE ORAL at 19:10

## 2018-08-15 ASSESSMENT — PAIN SCALES - GENERAL: PAINLEVEL_OUTOF10: 5

## 2018-08-16 NOTE — ED PROVIDER NOTES
"ED Provider Note    Scribed for Nima Corral M.D. by Jewels Mcdaniels. 8/15/2018, 6:54 PM.    Primary care provider: Pcp Pt States None  Means of arrival: walk in   History obtained from: patient   History limited by: none     CHIEF COMPLAINT  Chief Complaint   Patient presents with   • Post-Op Complications      , this morning noticed purulent drainage       HPI  Faith Pascual is a 28 y.o. female who presents to the Emergency Department drainage from her  wound.  The  was done on the third 12 days ago.  The past few days she has had some drainage comes in for evaluation no fever chills nausea vomiting all other systems are negative    REVIEW OF SYSTEMS  ROS    PAST MEDICAL HISTORY   has a past medical history of Fibroids (2018).    SURGICAL HISTORY   has a past surgical history that includes primary c section (); repeat c section (); and repeat c section (8/3/2018).    SOCIAL HISTORY  Social History   Substance Use Topics   • Smoking status: Never Smoker   • Smokeless tobacco: Never Used   • Alcohol use No      History   Drug Use No       FAMILY HISTORY  Family History   Problem Relation Age of Onset   • No Known Problems Mother    • No Known Problems Father    • No Known Problems Brother    • No Known Problems Brother        CURRENT MEDICATIONS  Home Medications     Reviewed by Rosa Shen, Student (Student Nurse) on 08/15/18 at 1833  Med List Status: Not Addressed   Medication Last Dose Status   ibuprofen (MOTRIN) 800 MG Tab  Active                ALLERGIES  No Known Allergies    PHYSICAL EXAM  VITAL SIGNS: /79   Pulse 89   Temp 36.1 °C (96.9 °F)   Resp 16   Ht 1.6 m (5' 3\")   Wt 70.2 kg (154 lb 12.2 oz)   LMP 2017   SpO2 98%   BMI 27.42 kg/m²     Constitutional:   No acute distress  HENT:  Moist mucous membranes  Eyes:  No conjunctivitis or icterus  Neck:  trachea is midline, no palpable thyroid  Cardiovascular:  Regular rate and " rhythm, no murmurs  Thorax & Lungs:  Normal breath sounds, no rhonchi  Abdomen: Some drainage minimally from 1 cm dehiscence in the wound is slight erythema on the right lateral aspect of the  wound soft, Non-tender        COURSE & MEDICAL DECISION MAKING  Pertinent Labs & Imaging studies reviewed. (See chart for details)    6:54 PM - Patient seen and examined at bedside. Patient will be treated with Keflex. . The differential diagnoses include but are not limited to: Wound infection    Medical Decision Making:   Patient does appear to have a minor wound infection of her  scar.  I am placing her on Keflex and given return precautions and follow-up with her OB doctor        FINAL IMPRESSION  1. Wound infection          Jewels TIJERINA (Scribe), am scribing for, and in the presence of, Nima Corral M.D..    Electronically signed by: Jewels Mcdaniels (Ellie), 8/15/2018    Nima TIJERINA M.D. personally performed the services described in this documentation, as scribed by Jewels Mcdaniels in my presence, and it is both accurate and complete.    The note accurately reflects work and decisions made by me.  Nima Corral  8/15/2018  7:03 PM

## 2018-08-16 NOTE — ED NOTES
Pt ambulates with baby and family up to room without incidence. Pt changes into gown when entering the room. Report same as triage; denies needs at this time.

## 2018-08-16 NOTE — ED TRIAGE NOTES
Ambulates to triage  Chief Complaint   Patient presents with   • Post-Op Complications      , this morning noticed purulent drainage     Denies any increased pain, not having any fevers at home.

## 2018-08-16 NOTE — ED NOTES
MD at bedside prior to discharge. Pt given discharge instructions and verbalized understanding. Rx's given and educated on where to . Pt ambulatory to lobby, gait steady. Pt took all belongings from room.

## 2018-08-17 ENCOUNTER — POST PARTUM (OUTPATIENT)
Dept: OBGYN | Facility: CLINIC | Age: 28
End: 2018-08-17
Payer: MEDICAID

## 2018-08-17 VITALS — DIASTOLIC BLOOD PRESSURE: 68 MMHG | WEIGHT: 154 LBS | SYSTOLIC BLOOD PRESSURE: 92 MMHG | BODY MASS INDEX: 27.28 KG/M2

## 2018-08-17 DIAGNOSIS — O28.3 ABNORMAL PREGNANCY US: ICD-10-CM

## 2018-08-17 DIAGNOSIS — O09.893 HISTORY OF PRETERM DELIVERY, CURRENTLY PREGNANT IN THIRD TRIMESTER: ICD-10-CM

## 2018-08-17 DIAGNOSIS — O99.119 THROMBOCYTOPENIA AFFECTING PREGNANCY (HCC): ICD-10-CM

## 2018-08-17 DIAGNOSIS — Z09 POSTOP CHECK: ICD-10-CM

## 2018-08-17 DIAGNOSIS — D69.6 THROMBOCYTOPENIA AFFECTING PREGNANCY (HCC): ICD-10-CM

## 2018-08-17 DIAGNOSIS — O09.893 SUPERVISION OF OTHER HIGH RISK PREGNANCIES, THIRD TRIMESTER: ICD-10-CM

## 2018-08-17 DIAGNOSIS — Z98.891 HISTORY OF CESAREAN SECTION: ICD-10-CM

## 2018-08-17 PROCEDURE — 90050 PR POSTPARTUM VISIT: CPT | Performed by: OBSTETRICS & GYNECOLOGY

## 2018-08-17 NOTE — PROGRESS NOTES
Pt. Here today for C/S check delivered on 8/3/18 Dr. Obrien  Currently : breast feeding  Pt. States ED for ABX  Post partum visit on 9/14/18

## 2018-08-17 NOTE — PROGRESS NOTES
S: Patient presents for 2 week postoperative visit/incision check status post repeat .  Patient was seen a few days ago in the emergency room and was diagnosed with cellulitis and was started on antibiotics.  Patient is doing well today without any complaints or concerns she denies any fevers or chills.. Denies nausea vomiting or dysuria.  Patient states cellulitis has almost completely resolved.    O:  VSS, Af  Neck supple  Chest clear to auscultation bilaterally  Cardiovascular regular rate and rhythm  Abdomen is soft nontender nondistended with normal bowel sounds.  Cellulitis is completely resolved.  Incision is intact and healing well.  No fluctuance noted.  No erythema noted  Extremities shows no clubbing cyanosis or edema no calf pain.      Assessment and plan:  Patient presents for 2 week postop check status post repeat .  Patient is doing well  Postoperative cellulitis almost completely resolved.  Patient advised to finish antibiotic course  Wound care discussed  Precautions and plan of care reviewed  Patient will continue postoperative restrictions  Patient to follow-up in 3-4 weeks for postpartum exam.

## 2020-11-03 ENCOUNTER — NON-PROVIDER VISIT (OUTPATIENT)
Dept: OCCUPATIONAL MEDICINE | Facility: CLINIC | Age: 30
End: 2020-11-03

## 2020-11-03 DIAGNOSIS — Z02.1 PRE-EMPLOYMENT DRUG SCREENING: ICD-10-CM

## 2020-11-03 LAB
AMP AMPHETAMINE: NORMAL
COC COCAINE: NORMAL
INT CON NEG: NORMAL
INT CON POS: NORMAL
MET METHAMPHETAMINES: NORMAL
OPI OPIATES: NORMAL
PCP PHENCYCLIDINE: NORMAL
POC DRUG COMMENT 753798-OCCUPATIONAL HEALTH: NORMAL
THC: NORMAL

## 2020-11-03 PROCEDURE — 80305 DRUG TEST PRSMV DIR OPT OBS: CPT | Performed by: PREVENTIVE MEDICINE

## 2021-09-16 ENCOUNTER — NON-PROVIDER VISIT (OUTPATIENT)
Dept: OCCUPATIONAL MEDICINE | Facility: CLINIC | Age: 31
End: 2021-09-16

## 2021-09-16 DIAGNOSIS — Z02.1 PRE-EMPLOYMENT HEALTH SCREENING EXAMINATION: ICD-10-CM

## 2021-09-16 DIAGNOSIS — Z02.1 PRE-EMPLOYMENT DRUG SCREENING: ICD-10-CM

## 2021-09-16 LAB
AMP AMPHETAMINE: NORMAL
COC COCAINE: NORMAL
INT CON NEG: NORMAL
INT CON POS: NORMAL
MET METHAMPHETAMINES: NORMAL
OPI OPIATES: NORMAL
PCP PHENCYCLIDINE: NORMAL
POC DRUG COMMENT 753798-OCCUPATIONAL HEALTH: NEGATIVE
THC: NORMAL

## 2021-09-16 PROCEDURE — 80305 DRUG TEST PRSMV DIR OPT OBS: CPT | Performed by: PREVENTIVE MEDICINE

## 2022-05-03 NOTE — DISCHARGE INSTRUCTIONS
Infecciones de la piel  (Skin Infections)  [place]ison infección en la piel generalmente es el resultado de la ruptura de la villagran cutánea.   CAUSAS   Puede ser consecuencia de:  · Un traumatismo o lesión en la piel, melchor un cindy o la picadura de un insecto.   · Inflamación (melchor en el miky de eczema).   · Bates en la piel que se encuentra entre los dedos (melchor en el pie de atleta).   · Hinchazón (edema).   SÍNTOMAS  Las piernas son el lugar que con más frecuencia se ve afectado. Generalmente se observa:  · Enrojecimiento   · Hinchazón   · Dolor   · Puede antonette líneas samuels en la jerald de la infección.   TRATAMIENTO  · Las infecciones menores pueden tratarse con antibióticos en forma tópica, ranjeet si la infección es grave, será necesaria la hospitalización y un tratamiento con antibióticos por vía intravenosa.   · La mayor parte de las infecciones de la piel pueden tratarse con antibióticos por vía oral y reposo, y la elevación de la jerald afectada hasta que la infección mejore.   · Si le recetan antibióticos por vía oral, es importante tomarlos krysten melchor se lo indiquen y debe brady todas las tabletas incluso si se siente mejor antes de finalizar el tratamiento.   · Puede aplicarse compresas tibias tariq 20 a 30 minutos, 4 veces por día.   Deberá aplicarse la vacuna contra el tetanos si:   · No sabe cuando recibió la última dosis.   · Nunca recibió esta vacuna.   · La herida está sucia.   Si usted necesita aplicarse la vacuna y se niega a recibirla, corre riesgo de contraer tétanos. Ésta es allison enfermedad grave. Si le cui aplicado la vacuna contra el tétanos, el brazo podrá hincharse, enrojecer o subirle la temperatura en el lugar de la inyección. [place] es frecuente y no constituye un problema.  SOLICITE ATENCIÓN MÉDICA SI:  El dolor y la inflamación no mejoran luego de 2 días.  SOLICITE ATENCIÓN MÉDICA DE INMEDIATO SI:  Tiene fiebre, escalofríos, u otros problemas de importancia.   Document Released: 12/18/2006 Document  Revised: 03/11/2013  [employer]® Patient Information ©2013 "[employer].   Yes

## 2022-05-09 ENCOUNTER — TELEPHONE (OUTPATIENT)
Dept: OBGYN | Facility: CLINIC | Age: 32
End: 2022-05-09

## 2022-10-29 NOTE — TELEPHONE ENCOUNTER
Notes recorded by Khoi Martinez M.D. on 5/16/2018 at 4:05 PM PDT  Patient is [facility] consultation for ultrasound of CNS evaluate for possible ventriculomegaly and other CNS abnormalities  Referral to [facility]    Unable to contact pt msg left to judie; back.     5/22/18 @ 7220 pt notified of u/s results, Dr. Gaona info provided via my chart per pt request.    
No Vaccines Administered.

## (undated) DEVICE — SUTURE 0 VICRYL PLUS CT-1 - 36 INCH (36/BX)

## (undated) DEVICE — SLEEVE, SEQUENTIAL CALF REG

## (undated) DEVICE — CATHETER IV NON-SAFETY 18 GA X 1 1/4 (50/BX 4BX/CA)

## (undated) DEVICE — WATER IRRIG. STER. 1500 ML - (9/CA)

## (undated) DEVICE — TELFA 8 X 10 BIOSEAL - (50EA/CA)

## (undated) DEVICE — SUTURE 1 CHROMIC CTX ETHICON - (36PK/BX)

## (undated) DEVICE — DRESSING INTERCEED ABSORBABLE ADHESION BARRIER TC7 (10EA/CA)

## (undated) DEVICE — 0 CHROMIC CT-1

## (undated) DEVICE — TAPE CLOTH MEDIPORE 6 INCH - (12RL/CA)

## (undated) DEVICE — DETERGENT RENUZYME PLUS 10 OZ PACKET (50/BX)

## (undated) DEVICE — TUBING CLEARLINK DUO-VENT - C-FLO (48EA/CA)

## (undated) DEVICE — SUTURE 2-0 CHROMIC GUT CT-1 27 (36PK/BX)"

## (undated) DEVICE — SHEATH RO 4F 10CM (10EA/BX)

## (undated) DEVICE — TRAY BLADDER CARE W/ 16 FR FOLEY CATHETER STATLOCK  (10/CA)

## (undated) DEVICE — PACK ROOM TURNOVER L&D (12/CA)

## (undated) DEVICE — PENCIL ELECTSURG 10FT HLSTR - WITH BLADE (50EA/CA)

## (undated) DEVICE — KIT  I.V. START (100EA/CA)

## (undated) DEVICE — PAD LAP STERILE 18 X 18 - (5/PK 40PK/CA)

## (undated) DEVICE — HEAD HOLDER JUNIOR/ADULT

## (undated) DEVICE — STAPLER SKIN DISP - (6/BX 10BX/CA) VISISTAT

## (undated) DEVICE — SUTURE 3-0 VICRYL PLUS CT-1 - 36 INCH (36/BX)

## (undated) DEVICE — TRAY SPINAL ANESTHESIA NON-SAFETY (10/CA)

## (undated) DEVICE — SUTURE 0 VICRYL PLUS CT 36 (36PK/BX)"

## (undated) DEVICE — PAD GROUNDING PRE-JELLED - (50EA/PK)

## (undated) DEVICE — LACTATED RINGERS INJ 1000 ML - (14EA/CA 60CA/PF)

## (undated) DEVICE — BLANKET UNDERBODY FULL ACCES - (5/CA)

## (undated) DEVICE — CANISTER SUCTION 3000ML MECHANICAL FILTER AUTO SHUTOFF MEDI-VAC NONSTERILE LF DISP  (40EA/CA)

## (undated) DEVICE — SPONGE GAUZESTER 4 X 4 4PLY - (128PK/CA)

## (undated) DEVICE — BAG, SPONGE COUNT 50600

## (undated) DEVICE — SODIUM CHL IRRIGATION 0.9% 1000ML (12EA/CA)

## (undated) DEVICE — CHLORAPREP 26 ML APPLICATOR - ORANGE TINT(25/CA)

## (undated) DEVICE — SET EXTENSION WITH 2 PORTS (48EA/CA) ***PART #2C8610 IS A SUBSTITUTE*****

## (undated) DEVICE — PACK C-SECTION (2EA/CA)

## (undated) DEVICE — GLOVE BIOGEL SZ 8 SURGICAL PF LTX - (50PR/BX 4BX/CA)